# Patient Record
Sex: FEMALE | Race: WHITE | Employment: UNEMPLOYED | URBAN - METROPOLITAN AREA
[De-identification: names, ages, dates, MRNs, and addresses within clinical notes are randomized per-mention and may not be internally consistent; named-entity substitution may affect disease eponyms.]

---

## 2017-07-26 ENCOUNTER — OFFICE VISIT (OUTPATIENT)
Dept: FAMILY MEDICINE CLINIC | Age: 57
End: 2017-07-26

## 2017-07-26 VITALS
DIASTOLIC BLOOD PRESSURE: 88 MMHG | OXYGEN SATURATION: 100 % | HEIGHT: 67 IN | WEIGHT: 180 LBS | HEART RATE: 64 BPM | BODY MASS INDEX: 28.25 KG/M2 | TEMPERATURE: 95.8 F | SYSTOLIC BLOOD PRESSURE: 146 MMHG

## 2017-07-26 DIAGNOSIS — Z00.00 ROUTINE GENERAL MEDICAL EXAMINATION AT A HEALTH CARE FACILITY: Primary | ICD-10-CM

## 2017-07-26 DIAGNOSIS — Z00.00 ROUTINE GENERAL MEDICAL EXAMINATION AT A HEALTH CARE FACILITY: ICD-10-CM

## 2017-07-26 DIAGNOSIS — E03.9 ACQUIRED HYPOTHYROIDISM: ICD-10-CM

## 2017-07-26 LAB
A/G RATIO: 1.8 (ref 1.1–2.2)
ALBUMIN SERPL-MCNC: 4.9 G/DL (ref 3.4–5)
ALP BLD-CCNC: 62 U/L (ref 40–129)
ALT SERPL-CCNC: 27 U/L (ref 10–40)
ANION GAP SERPL CALCULATED.3IONS-SCNC: 14 MMOL/L (ref 3–16)
AST SERPL-CCNC: 23 U/L (ref 15–37)
BILIRUB SERPL-MCNC: 0.8 MG/DL (ref 0–1)
BUN BLDV-MCNC: 15 MG/DL (ref 7–20)
CALCIUM SERPL-MCNC: 9.8 MG/DL (ref 8.3–10.6)
CHLORIDE BLD-SCNC: 100 MMOL/L (ref 99–110)
CHOLESTEROL, TOTAL: 208 MG/DL (ref 0–199)
CO2: 27 MMOL/L (ref 21–32)
CREAT SERPL-MCNC: 0.7 MG/DL (ref 0.6–1.1)
GFR AFRICAN AMERICAN: >60
GFR NON-AFRICAN AMERICAN: >60
GLOBULIN: 2.7 G/DL
GLUCOSE BLD-MCNC: 90 MG/DL (ref 70–99)
HDLC SERPL-MCNC: 84 MG/DL (ref 40–60)
LDL CHOLESTEROL CALCULATED: 111 MG/DL
POTASSIUM SERPL-SCNC: 4.2 MMOL/L (ref 3.5–5.1)
SODIUM BLD-SCNC: 141 MMOL/L (ref 136–145)
TOTAL PROTEIN: 7.6 G/DL (ref 6.4–8.2)
TRIGL SERPL-MCNC: 66 MG/DL (ref 0–150)
TSH REFLEX: 2.76 UIU/ML (ref 0.27–4.2)
VITAMIN D 25-HYDROXY: 33.1 NG/ML
VLDLC SERPL CALC-MCNC: 13 MG/DL

## 2017-07-26 PROCEDURE — 99396 PREV VISIT EST AGE 40-64: CPT | Performed by: FAMILY MEDICINE

## 2017-07-26 ASSESSMENT — ENCOUNTER SYMPTOMS
ANAL BLEEDING: 0
CONSTIPATION: 0
CHOKING: 0
VOICE CHANGE: 0
NAUSEA: 0
DIARRHEA: 0
TROUBLE SWALLOWING: 0
SORE THROAT: 0
CHEST TIGHTNESS: 0
SHORTNESS OF BREATH: 0
ABDOMINAL PAIN: 0
WHEEZING: 0
BLOOD IN STOOL: 0

## 2017-07-29 PROBLEM — R03.0 ELEVATED BLOOD PRESSURE READING: Status: ACTIVE | Noted: 2017-07-26

## 2017-09-14 ENCOUNTER — TELEPHONE (OUTPATIENT)
Dept: DERMATOLOGY | Age: 57
End: 2017-09-14

## 2017-09-18 ENCOUNTER — NURSE ONLY (OUTPATIENT)
Dept: FAMILY MEDICINE CLINIC | Age: 57
End: 2017-09-18

## 2017-09-18 DIAGNOSIS — Z23 NEED FOR INFLUENZA VACCINATION: Primary | ICD-10-CM

## 2017-09-18 PROCEDURE — 90471 IMMUNIZATION ADMIN: CPT | Performed by: FAMILY MEDICINE

## 2017-09-18 PROCEDURE — 90686 IIV4 VACC NO PRSV 0.5 ML IM: CPT | Performed by: FAMILY MEDICINE

## 2017-10-11 DIAGNOSIS — E03.9 ACQUIRED HYPOTHYROIDISM: ICD-10-CM

## 2017-10-11 RX ORDER — LEVOTHYROXINE SODIUM 50 MCG
TABLET ORAL
Qty: 90 TABLET | Refills: 2 | Status: SHIPPED | OUTPATIENT
Start: 2017-10-11 | End: 2018-03-20

## 2018-03-08 ENCOUNTER — TELEPHONE (OUTPATIENT)
Dept: FAMILY MEDICINE CLINIC | Age: 58
End: 2018-03-08

## 2018-03-20 ENCOUNTER — OFFICE VISIT (OUTPATIENT)
Dept: FAMILY MEDICINE CLINIC | Age: 58
End: 2018-03-20

## 2018-03-20 VITALS
BODY MASS INDEX: 28.07 KG/M2 | WEIGHT: 184.6 LBS | OXYGEN SATURATION: 100 % | HEART RATE: 65 BPM | SYSTOLIC BLOOD PRESSURE: 132 MMHG | TEMPERATURE: 96 F | DIASTOLIC BLOOD PRESSURE: 84 MMHG | RESPIRATION RATE: 16 BRPM

## 2018-03-20 DIAGNOSIS — I10 ESSENTIAL HYPERTENSION, BENIGN: Primary | ICD-10-CM

## 2018-03-20 DIAGNOSIS — Z00.00 WELL ADULT EXAM: ICD-10-CM

## 2018-03-20 DIAGNOSIS — E03.9 ACQUIRED HYPOTHYROIDISM: ICD-10-CM

## 2018-03-20 DIAGNOSIS — R73.9 HYPERGLYCEMIA: ICD-10-CM

## 2018-03-20 PROCEDURE — 1111F DSCHRG MED/CURRENT MED MERGE: CPT | Performed by: FAMILY MEDICINE

## 2018-03-20 PROCEDURE — 99214 OFFICE O/P EST MOD 30 MIN: CPT | Performed by: FAMILY MEDICINE

## 2018-03-20 RX ORDER — LEVOTHYROXINE SODIUM 0.07 MG/1
75 TABLET ORAL DAILY
Qty: 90 TABLET | Refills: 1 | Status: SHIPPED | OUTPATIENT
Start: 2018-03-20 | End: 2018-09-05 | Stop reason: SDUPTHER

## 2018-03-20 RX ORDER — LOSARTAN POTASSIUM 50 MG/1
50 TABLET ORAL DAILY
Qty: 30 TABLET | Refills: 1 | Status: SHIPPED | OUTPATIENT
Start: 2018-03-20 | End: 2018-09-05 | Stop reason: SDUPTHER

## 2018-03-20 RX ORDER — LOSARTAN POTASSIUM 50 MG/1
50 TABLET ORAL DAILY
Qty: 90 TABLET | Refills: 1 | Status: SHIPPED | OUTPATIENT
Start: 2018-03-20 | End: 2018-03-20 | Stop reason: SDUPTHER

## 2018-03-20 NOTE — PROGRESS NOTES
Post-Discharge Transitional Care Management Services      Monica Bourne   YOB: 1960    Date of Office Visit:  3/20/2018  Date of Hospital Admission: 3/8/18  Date of Hospital Discharge: 3/8/18  [facility] Risk Score [risk of hospital readmission >=10  medium risk (chance of readmission ~ 12%) >14  high risk (chance of readmission ~18%)]:Risk Score: 0    Care management risk score Rising risk (score 2-5) and Complex Care (Scores >=6): 0       Patient Active Problem List   Diagnosis    Hypothyroidism    Rosacea    Routine general medical examination at a health care facility    Elevated blood pressure reading       Allergies   Allergen Reactions    Adhesive Tape      Pt had a burn around edges of bandage used with surgery in sept 2011 (compression type bandage    Sulfa Antibiotics Hives    Bactrim [Sulfamethoxazole-Trimethoprim] Rash    Lisinopril      cough       Medications listed as ordered at the time of discharge from hospital   Alvarado Lyons E   Home Medication Instructions JOHN:    Printed on:03/20/18 3774   Medication Information                      losartan (COZAAR) 25 MG tablet  Take 2 tablets by mouth daily             Omega 3-6-9 Fatty Acids (OMEGA 3-6-9 COMPLEX PO)  Take  by mouth 2 times daily. SYNTHROID 50 MCG tablet  TAKE 1 TABLET BY MOUTH DAILY             therapeutic multivitamin-minerals (THERAGRAN-M) tablet  Take 1 tablet by mouth daily. Medications marked \"taking\" at this time  Outpatient Prescriptions Marked as Taking for the 3/20/18 encounter (Office Visit) with Callum Malhotra MD   Medication Sig Dispense Refill    losartan (COZAAR) 25 MG tablet Take 2 tablets by mouth daily 30 tablet 0    SYNTHROID 50 MCG tablet TAKE 1 TABLET BY MOUTH DAILY 90 tablet 2    Omega 3-6-9 Fatty Acids (OMEGA 3-6-9 COMPLEX PO) Take  by mouth 2 times daily.  therapeutic multivitamin-minerals (THERAGRAN-M) tablet Take 1 tablet by mouth daily. Medications patient taking as of now reconciled against medications ordered at time of hospital discharge: Yes    Vitals:    03/20/18 1516 03/20/18 1539   BP: (!) 170/99 (!) 130/90   Pulse: 65    Resp: 16    Temp: 96 °F (35.6 °C)    SpO2: 100%    Weight: 184 lb 9.6 oz (83.7 kg)      Body mass index is 28.07 kg/m². Wt Readings from Last 3 Encounters:   03/20/18 184 lb 9.6 oz (83.7 kg)   03/08/18 180 lb (81.6 kg)   07/26/17 180 lb (81.6 kg)     BP Readings from Last 3 Encounters:   03/20/18 (!) 130/90   03/08/18 (!) 185/106   07/26/17 (!) 146/88        Inpatient course: Discharge summary reviewed- see chart. Chief Complaint   Patient presents with    Follow-Up from Hospital     seen at [facility] 3/8/18 for high BP, 136/84 this am     History of Present illness - Follow up of Hospital diagnosis(es): hypertension      Non face to face  following discharge, date last encounter closed (first attempt may have been earlier): *No documented post hospital discharge outreach found in the last 14 days    Call initiated 2 business days of discharge: *No response recorded in the last 14 days     Interval history/Current status: BP max was 200/110 at home. Checked BP because her  had a HA and was checking his. Went to ER. Was not taking NSAIDs or decongestants. Was asymptomatic. Work up in the ER was negative and was started on Cozaar. Home BP since than 117/67 to 151/92 the past 5 days. Higher if has been active. If she sits for 5 to 10 minutes it goes back down. She eats well, HAI  Exercises regularly. TSH in the ER was increased slightly. Physical Exam:  NAD    Skin is warm and dry. No rash. Well hydrated  Alert and oriented x 3. Mood and affect are normal.  The neck is supple and free of adenopathy or masses, the thyroid is normal without enlargement or nodules. Chest: clear with no wheezes or rales. No retractions, or use of accessory muscles noted.     Cardiovascular: PMI is not

## 2018-04-17 ENCOUNTER — OFFICE VISIT (OUTPATIENT)
Dept: DERMATOLOGY | Age: 58
End: 2018-04-17

## 2018-04-17 DIAGNOSIS — L21.9 SEBORRHEIC DERMATITIS: ICD-10-CM

## 2018-04-17 DIAGNOSIS — L70.0 ACNE VULGARIS: ICD-10-CM

## 2018-04-17 DIAGNOSIS — D22.9 MULTIPLE NEVI: Primary | ICD-10-CM

## 2018-04-17 DIAGNOSIS — Z80.8 FAMILY HISTORY OF MALIGNANT MELANOMA: ICD-10-CM

## 2018-04-17 PROCEDURE — 99213 OFFICE O/P EST LOW 20 MIN: CPT | Performed by: DERMATOLOGY

## 2018-06-08 DIAGNOSIS — Z00.00 WELL ADULT EXAM: ICD-10-CM

## 2018-06-08 DIAGNOSIS — I10 ESSENTIAL HYPERTENSION, BENIGN: ICD-10-CM

## 2018-06-08 DIAGNOSIS — R73.9 HYPERGLYCEMIA: ICD-10-CM

## 2018-06-08 DIAGNOSIS — E03.9 ACQUIRED HYPOTHYROIDISM: ICD-10-CM

## 2018-06-08 LAB
ALBUMIN SERPL-MCNC: 4.8 G/DL (ref 3.4–5)
ALP BLD-CCNC: 54 U/L (ref 40–129)
ALT SERPL-CCNC: 40 U/L (ref 10–40)
ANION GAP SERPL CALCULATED.3IONS-SCNC: 16 MMOL/L (ref 3–16)
AST SERPL-CCNC: 31 U/L (ref 15–37)
BILIRUB SERPL-MCNC: 0.8 MG/DL (ref 0–1)
BILIRUBIN DIRECT: <0.2 MG/DL (ref 0–0.3)
BILIRUBIN, INDIRECT: NORMAL MG/DL (ref 0–1)
BUN BLDV-MCNC: 15 MG/DL (ref 7–20)
CALCIUM SERPL-MCNC: 9.7 MG/DL (ref 8.3–10.6)
CHLORIDE BLD-SCNC: 99 MMOL/L (ref 99–110)
CHOLESTEROL, TOTAL: 207 MG/DL (ref 0–199)
CO2: 26 MMOL/L (ref 21–32)
CREAT SERPL-MCNC: 0.6 MG/DL (ref 0.6–1.1)
GFR AFRICAN AMERICAN: >60
GFR NON-AFRICAN AMERICAN: >60
GLUCOSE BLD-MCNC: 97 MG/DL (ref 70–99)
HDLC SERPL-MCNC: 86 MG/DL (ref 40–60)
LDL CHOLESTEROL CALCULATED: 104 MG/DL
POTASSIUM SERPL-SCNC: 3.9 MMOL/L (ref 3.5–5.1)
SODIUM BLD-SCNC: 141 MMOL/L (ref 136–145)
TOTAL PROTEIN: 7.5 G/DL (ref 6.4–8.2)
TRIGL SERPL-MCNC: 84 MG/DL (ref 0–150)
TSH REFLEX: 3.55 UIU/ML (ref 0.27–4.2)
VLDLC SERPL CALC-MCNC: 17 MG/DL

## 2018-06-09 LAB
ESTIMATED AVERAGE GLUCOSE: 102.5 MG/DL
HBA1C MFR BLD: 5.2 %

## 2018-09-05 DIAGNOSIS — E03.9 ACQUIRED HYPOTHYROIDISM: ICD-10-CM

## 2018-09-05 DIAGNOSIS — I10 ESSENTIAL HYPERTENSION, BENIGN: ICD-10-CM

## 2018-09-05 RX ORDER — LEVOTHYROXINE SODIUM 0.07 MG/1
75 TABLET ORAL DAILY
Qty: 90 TABLET | Refills: 2 | Status: SHIPPED | OUTPATIENT
Start: 2018-09-05 | End: 2019-06-12 | Stop reason: SDUPTHER

## 2018-09-05 RX ORDER — LOSARTAN POTASSIUM 50 MG/1
50 TABLET ORAL DAILY
Qty: 90 TABLET | Refills: 2 | Status: SHIPPED | OUTPATIENT
Start: 2018-09-05 | End: 2018-10-10 | Stop reason: SDUPTHER

## 2018-10-10 ENCOUNTER — OFFICE VISIT (OUTPATIENT)
Dept: FAMILY MEDICINE CLINIC | Age: 58
End: 2018-10-10
Payer: COMMERCIAL

## 2018-10-10 VITALS
RESPIRATION RATE: 10 BRPM | HEART RATE: 69 BPM | SYSTOLIC BLOOD PRESSURE: 130 MMHG | DIASTOLIC BLOOD PRESSURE: 82 MMHG | WEIGHT: 186.4 LBS | TEMPERATURE: 97.3 F | BODY MASS INDEX: 28.34 KG/M2 | OXYGEN SATURATION: 99 %

## 2018-10-10 DIAGNOSIS — I10 ESSENTIAL HYPERTENSION, BENIGN: ICD-10-CM

## 2018-10-10 DIAGNOSIS — R10.13 DYSPEPSIA: Primary | ICD-10-CM

## 2018-10-10 PROCEDURE — 99214 OFFICE O/P EST MOD 30 MIN: CPT | Performed by: FAMILY MEDICINE

## 2018-10-10 RX ORDER — LOSARTAN POTASSIUM 100 MG/1
100 TABLET ORAL DAILY
Qty: 90 TABLET | Refills: 1 | Status: SHIPPED | OUTPATIENT
Start: 2018-10-10 | End: 2018-12-10 | Stop reason: SDUPTHER

## 2018-10-10 RX ORDER — OMEPRAZOLE 20 MG/1
20 CAPSULE, DELAYED RELEASE ORAL DAILY
Qty: 30 CAPSULE | Refills: 1 | Status: SHIPPED | OUTPATIENT
Start: 2018-10-10 | End: 2018-12-10 | Stop reason: ALTCHOICE

## 2018-10-10 ASSESSMENT — PATIENT HEALTH QUESTIONNAIRE - PHQ9
2. FEELING DOWN, DEPRESSED OR HOPELESS: 0
SUM OF ALL RESPONSES TO PHQ9 QUESTIONS 1 & 2: 0
1. LITTLE INTEREST OR PLEASURE IN DOING THINGS: 0
SUM OF ALL RESPONSES TO PHQ QUESTIONS 1-9: 0
SUM OF ALL RESPONSES TO PHQ QUESTIONS 1-9: 0

## 2018-10-10 NOTE — PROGRESS NOTES
Subjective:      Patient ID: Ayaz Richard 66 y.o. female is here for evaluation for chest pain and BP check. HPI    Priscila Campoverde had an episode of upper back pain about 5 weeks ago. Was associated with pain in the lower anterior chest/epigastrium . No associated bowel changes, nausea vomiting. I talked to her on call; was advised to try Tums and watch the symptoms and follow up if persisted. Tums did help. Symptoms lasted 2 days and resolved. She has a reoccurrence of the same lower chest/upper abdomen pain x one week. No back pain this matthew. Waxes and wanes. Sometimes has burning in the mid chest.  Mild feeling of needing to burp. No associated with eating and no associated nausea or vomiting. No jono or hematochezia. Denies weight loss. Does not take NSAIDS>    The pain is not worse with exercise and has no shortness of breath. Rx: Tums helps slightly. Hypertension: Patient here for follow-up of elevated blood pressure. She is exercising and is adherent to low salt diet. Blood pressure is not well controlled at home. 140/80s  Cardiac symptoms none. Patient denies chest pain, dyspnea, irregular heart beat, lower extremity edema and palpitations. Cardiovascular risk factors: hypertension. Use of agents associated with hypertension: none. History of target organ damage: none. Outpatient Prescriptions Marked as Taking for the 10/10/18 encounter (Office Visit) with Flo Cornelius MD   Medication Sig Dispense Refill    levothyroxine (SYNTHROID) 75 MCG tablet Take 1 tablet by mouth Daily 90 tablet 2    losartan (COZAAR) 50 MG tablet Take 1 tablet by mouth daily 90 tablet 2    Omega 3-6-9 Fatty Acids (OMEGA 3-6-9 COMPLEX PO) Take  by mouth 2 times daily.  therapeutic multivitamin-minerals (THERAGRAN-M) tablet Take 1 tablet by mouth daily.           Allergies   Allergen Reactions    Adhesive Tape      Pt had a burn around edges of bandage used with surgery in sept 2011

## 2018-11-08 ENCOUNTER — OFFICE VISIT (OUTPATIENT)
Dept: FAMILY MEDICINE CLINIC | Age: 58
End: 2018-11-08
Payer: COMMERCIAL

## 2018-11-08 VITALS
DIASTOLIC BLOOD PRESSURE: 88 MMHG | BODY MASS INDEX: 28.71 KG/M2 | WEIGHT: 188.8 LBS | TEMPERATURE: 96.9 F | HEART RATE: 75 BPM | OXYGEN SATURATION: 100 % | SYSTOLIC BLOOD PRESSURE: 155 MMHG

## 2018-11-08 DIAGNOSIS — L03.116 CELLULITIS OF LEFT LEG: Primary | ICD-10-CM
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
## 2018-11-08 DIAGNOSIS — I10 ESSENTIAL HYPERTENSION: ICD-10-CM

## 2018-11-08 PROCEDURE — 99214 OFFICE O/P EST MOD 30 MIN: CPT | Performed by: FAMILY MEDICINE

## 2018-11-08 RX ORDER — DOXYCYCLINE HYCLATE 100 MG
100 TABLET ORAL 2 TIMES DAILY
Qty: 20 TABLET | Refills: 0 | Status: SHIPPED | OUTPATIENT
Start: 2018-11-08 | End: 2018-11-18

## 2018-11-08 RX ORDER — AMLODIPINE BESYLATE 5 MG/1
5 TABLET ORAL DAILY
Qty: 30 TABLET | Refills: 1 | Status: SHIPPED | OUTPATIENT
Start: 2018-11-08 | End: 2018-12-10 | Stop reason: SDUPTHER

## 2018-11-08 NOTE — PROGRESS NOTES
Patient is here for rash/ welts to left thigh. No new soaps, lotions or detergents or clothes. She had been doing some yard work, but wearing long pants. Itching . 4 spots to left inner thigh and 1 spot to right lower leg medial.   Itching. No fever. No caffeine this am.   Her blood pressure at home has been 140s/ 80s. Review of Systems    ROS: All other systems were reviewed and are negative . Patient's allergies and medications were reviewed. Patient's past medical, surgical, social , and family history were reviewed. BP Readings from Last 3 Encounters:   11/08/18 (!) 157/91   10/10/18 130/82   03/20/18 132/84     Wt Readings from Last 3 Encounters:   11/08/18 188 lb 12.8 oz (85.6 kg)   10/10/18 186 lb 6.4 oz (84.6 kg)   03/20/18 184 lb 9.6 oz (83.7 kg)     OBJECTIVE:  BP (!) 155/88   Pulse 75   Temp 96.9 °F (36.1 °C) (Oral)   Wt 188 lb 12.8 oz (85.6 kg)   SpO2 100%   Breastfeeding? No   BMI 28.71 kg/m²     Physical Exam    General: NAD, cooperative, alert and oriented X 3. Mood / affect is good. good insight. well hydrated. Neck : no lymphadenopathy, supple, FROM  CV: Regular rate and rhythm , no murmurs/ rub/ gallop. No edema. Lungs : CTA bilaterally, breathing comfortably  Abdomen: positive bowel sounds, soft , non tender, non distended. No hepatosplenomegaly. No CVA tenderness. Skin: LLE with erythematous circular rash X 4 areas with streakiness to medial, posterior thigh. mild tenderness to area. Center slightly raised of each area. No drainage. RLE:  1 patch of erythema, circular to posterior, medial calf- 5 cm. ASSESSMENT/  PLAN:  1. Cellulitis of left leg  - moist heat. - doxycycline hyclate (VIBRA-TABS) 100 MG tablet; Take 1 tablet by mouth 2 times daily for 10 days  Dispense: 20 tablet; Refill: 0    2. Bug bite, initial encounter  - moist heat. Benadryl prn itching or Zyrtec qd prn.     3. Essential hypertension  - elevated. Add Norvasc 5 mg qd.  Medication

## 2018-12-10 ENCOUNTER — OFFICE VISIT (OUTPATIENT)
Dept: FAMILY MEDICINE CLINIC | Age: 58
End: 2018-12-10
Payer: COMMERCIAL

## 2018-12-10 VITALS
BODY MASS INDEX: 28.81 KG/M2 | HEART RATE: 76 BPM | DIASTOLIC BLOOD PRESSURE: 80 MMHG | SYSTOLIC BLOOD PRESSURE: 138 MMHG | TEMPERATURE: 97.6 F | OXYGEN SATURATION: 100 % | WEIGHT: 189.5 LBS | RESPIRATION RATE: 12 BRPM

## 2018-12-10 DIAGNOSIS — I10 ESSENTIAL HYPERTENSION: Primary | ICD-10-CM

## 2018-12-10 PROBLEM — R03.0 ELEVATED BLOOD PRESSURE READING: Status: RESOLVED | Noted: 2017-07-26 | Resolved: 2018-12-10

## 2018-12-10 PROCEDURE — 99213 OFFICE O/P EST LOW 20 MIN: CPT | Performed by: FAMILY MEDICINE

## 2018-12-10 RX ORDER — AMLODIPINE BESYLATE 5 MG/1
5 TABLET ORAL DAILY
Qty: 90 TABLET | Refills: 1 | Status: SHIPPED | OUTPATIENT
Start: 2018-12-10 | End: 2019-05-28 | Stop reason: SDUPTHER

## 2018-12-10 RX ORDER — LOSARTAN POTASSIUM 100 MG/1
100 TABLET ORAL DAILY
Qty: 90 TABLET | Refills: 1 | Status: SHIPPED | OUTPATIENT
Start: 2018-12-10 | End: 2019-02-15 | Stop reason: SDUPTHER

## 2018-12-10 NOTE — PROGRESS NOTES
Subjective:      Patient ID: Waleska Arredondo 66 y.o. female. The encounter diagnosis was Essential hypertension. HPI    Hypertension: Patient here for follow-up of elevated blood pressure. She is exercising and is adherent to low salt diet. Blood pressure is well controlled at home. Cardiac symptoms none. Patient denies chest pain, chest pressure/discomfort, irregular heart beat, lower extremity edema and palpitations. Cardiovascular risk factors: hypertension and sedentary lifestyle. Use of agents associated with hypertension: none. History of target organ damage: angina/ prior myocardial infarction. Outpatient Prescriptions Marked as Taking for the 12/10/18 encounter (Office Visit) with Michelle Barrientos MD   Medication Sig Dispense Refill    amLODIPine (NORVASC) 5 MG tablet Take 1 tablet by mouth daily 30 tablet 1    losartan (COZAAR) 100 MG tablet Take 1 tablet by mouth daily 90 tablet 1    levothyroxine (SYNTHROID) 75 MCG tablet Take 1 tablet by mouth Daily 90 tablet 2    Omega 3-6-9 Fatty Acids (OMEGA 3-6-9 COMPLEX PO) Take  by mouth 2 times daily.  therapeutic multivitamin-minerals (THERAGRAN-M) tablet Take 1 tablet by mouth daily.           Allergies   Allergen Reactions    Adhesive Tape      Pt had a burn around edges of bandage used with surgery in sept 2011 (compression type bandage    Sulfa Antibiotics Hives    Bactrim [Sulfamethoxazole-Trimethoprim] Rash    Lisinopril      cough       Patient Active Problem List   Diagnosis    Hypothyroidism    Rosacea    Hypertension       Past Medical History:   Diagnosis Date    Hypertension     Hypothyroidism        Past Surgical History:   Procedure Laterality Date    APPENDECTOMY  2010    YARIEL AND BSO  2011    fibroid    URETER REVISION  9/2011    Dr. Delano Barrera        Family History   Problem Relation Age of Onset   Judyth Yamini Hypertension Mother     Thyroid Cancer Mother 76        anaplastic    Heart Disease Father         valve

## 2019-01-08 ENCOUNTER — TELEPHONE (OUTPATIENT)
Dept: FAMILY MEDICINE CLINIC | Age: 59
End: 2019-01-08

## 2019-02-15 DIAGNOSIS — I10 ESSENTIAL HYPERTENSION: ICD-10-CM

## 2019-02-15 RX ORDER — LOSARTAN POTASSIUM 100 MG/1
100 TABLET ORAL DAILY
Qty: 90 TABLET | Refills: 1 | Status: SHIPPED | OUTPATIENT
Start: 2019-02-15 | End: 2019-06-12 | Stop reason: SDUPTHER

## 2019-02-15 RX ORDER — LOSARTAN POTASSIUM 100 MG/1
100 TABLET ORAL DAILY
Qty: 90 TABLET | Refills: 1 | Status: SHIPPED | OUTPATIENT
Start: 2019-02-15 | End: 2019-02-15 | Stop reason: SDUPTHER

## 2019-05-28 DIAGNOSIS — I10 ESSENTIAL HYPERTENSION: ICD-10-CM

## 2019-05-29 RX ORDER — AMLODIPINE BESYLATE 5 MG/1
5 TABLET ORAL DAILY
Qty: 90 TABLET | Refills: 1 | Status: SHIPPED | OUTPATIENT
Start: 2019-05-29 | End: 2019-07-06

## 2019-05-30 ENCOUNTER — OFFICE VISIT (OUTPATIENT)
Dept: DERMATOLOGY | Age: 59
End: 2019-05-30
Payer: COMMERCIAL

## 2019-05-30 DIAGNOSIS — D22.9 MULTIPLE NEVI: Primary | ICD-10-CM

## 2019-05-30 DIAGNOSIS — Z80.8 FAMILY HISTORY OF MALIGNANT MELANOMA: ICD-10-CM

## 2019-05-30 DIAGNOSIS — L70.0 ACNE VULGARIS: ICD-10-CM

## 2019-05-30 DIAGNOSIS — D23.9 DERMATOFIBROMA: ICD-10-CM

## 2019-05-30 DIAGNOSIS — L57.0 AK (ACTINIC KERATOSIS): ICD-10-CM

## 2019-05-30 PROCEDURE — 17000 DESTRUCT PREMALG LESION: CPT | Performed by: DERMATOLOGY

## 2019-05-30 PROCEDURE — 99214 OFFICE O/P EST MOD 30 MIN: CPT | Performed by: DERMATOLOGY

## 2019-05-30 NOTE — PROGRESS NOTES
[facility]  Chilango Combs MD  [address]  1960    66 y.o. female     Date of Visit: 5/30/2019    Chief Complaint: moles/lesions  Chief Complaint   Patient presents with    Skin Exam     Last seen: 4-2018    History of Present Illness:  1. Here for full skin check; has scattered asx moles on the trunk and extremities, many present since child/young adult; no change in s/s/c of any lesions; no bleeding lesions. No personal hx of skin cancer. No family hx of skin cancer except for daughter with hx of atypical pigmented lesion as noted below. Wears sunscreen regularly. 2. F/u for several year hx of erythema and scaling involving the brows. It occurs intermittently and is worse in the winter. She again notes that she clears completely intermittently. She has tried HC 2.5 oint and elidel in the past w/o improvement. Previously the central part of her brows were flared. She picks at the area. Denies rough spots/scaling elsewhere on the face or body. 3. F/u mild comedonal acne and milia. She had used atralin for awhile after a previous visit but has not been using anything recently. Has been getting dermaplaning and feels that this helps. 4. She has a rough spot on the R cheek. Asx. Her daughter Lizet Hart had a pigmented lesion on the upper back treated as MM in situ (2014). Review of Systems:  Gen: Feels well, good sense of health. Skin: No changing moles or lesions. Past Medical History, Family History, Surgical History, Medications and Allergies reviewed.     Past Medical History:   Diagnosis Date    Hypertension     Hypothyroidism        Past Surgical History:   Procedure Laterality Date    APPENDECTOMY  2010    YARIEL AND BSO  2011    fibroid    URETER REVISION  9/2011    Dr. Elyssa Zhang       Outpatient Medications Marked as Taking for the 5/30/19 encounter (Office Visit) with Susan Pitts MD   Medication Sig Dispense Refill qhs; ed irritaitonm photosens    4. AK - R cheek  - lesion(s) treated with liquid nitrogen x 2 cycles. Patient educated on risk of blister, hypopigmentation/scar and wound care.

## 2019-06-11 NOTE — PROGRESS NOTES
Subjective:      Patient ID: Ramon Woodruff is a 66 y.o. female is here for her annual wellness exam.     HPI    Diet;  Eats pretty well  Eats at home most of the time. Low sodium  Sleeps well  Exercise walks, occ weights. S/P hysterectomy - fibroid. Is not checking BP at home. Health Maintenance   Topic Date Due    Shingles Vaccine (2 of 3) 03/17/2015    TSH testing  06/08/2019    Potassium monitoring  10/10/2019    Creatinine monitoring  10/10/2019    Breast cancer screen  05/15/2020    DTaP/Tdap/Td vaccine (2 - Td) 10/04/2021    Colon cancer screen colonoscopy  10/29/2022    Lipid screen  06/08/2023    Flu vaccine  Completed    Hepatitis C screen  Completed    HIV screen  Completed    Pneumococcal 0-64 years Vaccine  Aged Out           Outpatient Medications Marked as Taking for the 6/12/19 encounter (Office Visit) with Maria Del Carmen Iglesias MD   Medication Sig Dispense Refill    losartan (COZAAR) 100 MG tablet Take 1 tablet by mouth daily 90 tablet 3    SYNTHROID 75 MCG tablet Take 1 tablet by mouth Daily 90 tablet 3    tretinoin (RETIN-A) 0.025 % cream Apply a pea sized amount to the face QHS 20 g 4    amLODIPine (NORVASC) 5 MG tablet Take 1 tablet by mouth daily 90 tablet 1    Omega 3-6-9 Fatty Acids (OMEGA 3-6-9 COMPLEX PO) Take  by mouth 2 times daily.  therapeutic multivitamin-minerals (THERAGRAN-M) tablet Take 1 tablet by mouth daily.          Allergies   Allergen Reactions    Adhesive Tape      Pt had a burn around edges of bandage used with surgery in sept 2011 (compression type bandage    Sulfa Antibiotics Hives    Bactrim [Sulfamethoxazole-Trimethoprim] Rash    Lisinopril      cough       Patient Active Problem List   Diagnosis    Hypothyroidism    Rosacea    Hypertension        Past Medical History:   Diagnosis Date    Hypertension     Hypothyroidism        Past Surgical History:   Procedure Laterality Date    APPENDECTOMY  2010    YARIEL AND BSO  2011    fibroid    AST 23 10/10/2018    ALT 30 10/10/2018    LABGLOM >60 10/10/2018    GFRAA >60 10/10/2018    AGRATIO 1.7 10/10/2018    GLOB 2.7 10/10/2018       Lab Results   Component Value Date    CHOL 207 (H) 06/08/2018    CHOL 208 (H) 07/26/2017    CHOL 225 (H) 07/25/2016     Lab Results   Component Value Date    TRIG 84 06/08/2018    TRIG 66 07/26/2017    TRIG 71 07/25/2016     Lab Results   Component Value Date    HDL 86 (H) 06/08/2018    HDL 84 (H) 07/26/2017     (H) 07/25/2016     Lab Results   Component Value Date    LDLCALC 104 (H) 06/08/2018    LDLCALC 111 (H) 07/26/2017    LDLCALC 106 (H) 07/25/2016     Lab Results   Component Value Date    LABVLDL 17 06/08/2018    LABVLDL 13 07/26/2017    LABVLDL 14 07/25/2016     No results found for: CHOLHDLRATIO  TSH   Date Value Ref Range Status   06/08/2018 3.55 0.27 - 4.20 uIU/mL Final   03/08/2018 4.24 (H) 0.27 - 4.20 uIU/mL Final   07/26/2017 2.76 0.27 - 4.20 uIU/mL Final   05/16/2013 1.97 uIU/mL Final   06/19/2012 2.42 0.35 - 5.5 uIU/ml Final   10/04/2011 3.42 0.35 - 5.5 uIU/ml Final   12/16/2010 2.23 0.35 - 5.5 uIU/ml Final      Objective:   Physical Exam  .  Vitals:    06/12/19 0941 06/12/19 1039   BP: (!) 171/101 128/80   Pulse: 74    Resp: 20    Temp: 96.6 °F (35.9 °C)    TempSrc: Oral    SpO2: 99%    Weight: 189 lb 8 oz (86 kg)    Height: 5' 8\" (1.727 m)       Wt Readings from Last 3 Encounters:   06/12/19 189 lb 8 oz (86 kg)   12/10/18 189 lb 8 oz (86 kg)   11/08/18 188 lb 12.8 oz (85.6 kg)        Physical Exam   Constitutional: She appears well-developed and well-nourished. HENT:   Head: Normocephalic and atraumatic. Right Ear: Hearing, tympanic membrane, external ear and ear canal normal.   Left Ear: Hearing, tympanic membrane, external ear and ear canal normal.   Nose: Nose normal.   Mouth/Throat: Uvula is midline, oropharynx is clear and moist and mucous membranes are normal.   Eyes: Pupils are equal, round, and reactive to light.  Conjunctivae, EOM and and behavior is normal. Judgment and thought content normal. Cognition and memory are normal.         Assessment and Plan       Diagnosis Orders   1. Well adult exam  Comprehensive Metabolic Panel    TSH with Reflex    Lipid Panel  Discussed diet, exercise   Calcium and Vitamin D addressed  PAP not indicated  Annual to biannual mammogram  Annual influenza vaccine  Dt every 10 years  Colonoscopy every 10 years until age 78       2. Essential hypertension  Comprehensive Metabolic Panel    losartan (COZAAR) 100 MG tablet    At goal < 130/80   3. Acquired hypothyroidism  TSH with Reflex    SYNTHROID 75 MCG tablet              Side effects of current medications reviewed and questions answered. Follow up in 1 year or prn.

## 2019-06-12 ENCOUNTER — OFFICE VISIT (OUTPATIENT)
Dept: FAMILY MEDICINE CLINIC | Age: 59
End: 2019-06-12
Payer: COMMERCIAL

## 2019-06-12 VITALS
SYSTOLIC BLOOD PRESSURE: 128 MMHG | RESPIRATION RATE: 20 BRPM | HEART RATE: 74 BPM | OXYGEN SATURATION: 99 % | BODY MASS INDEX: 28.72 KG/M2 | HEIGHT: 68 IN | TEMPERATURE: 96.6 F | WEIGHT: 189.5 LBS | DIASTOLIC BLOOD PRESSURE: 80 MMHG

## 2019-06-12 DIAGNOSIS — I10 ESSENTIAL HYPERTENSION: ICD-10-CM

## 2019-06-12 DIAGNOSIS — E03.9 ACQUIRED HYPOTHYROIDISM: ICD-10-CM

## 2019-06-12 DIAGNOSIS — Z00.00 WELL ADULT EXAM: Primary | ICD-10-CM

## 2019-06-12 DIAGNOSIS — Z00.00 WELL ADULT EXAM: ICD-10-CM

## 2019-06-12 LAB
A/G RATIO: 1.5 (ref 1.1–2.2)
ALBUMIN SERPL-MCNC: 4.8 G/DL (ref 3.4–5)
ALP BLD-CCNC: 50 U/L (ref 40–129)
ALT SERPL-CCNC: 72 U/L (ref 10–40)
ANION GAP SERPL CALCULATED.3IONS-SCNC: 14 MMOL/L (ref 3–16)
AST SERPL-CCNC: 52 U/L (ref 15–37)
BILIRUB SERPL-MCNC: 0.7 MG/DL (ref 0–1)
BUN BLDV-MCNC: 17 MG/DL (ref 7–20)
CALCIUM SERPL-MCNC: 10 MG/DL (ref 8.3–10.6)
CHLORIDE BLD-SCNC: 103 MMOL/L (ref 99–110)
CHOLESTEROL, TOTAL: 202 MG/DL (ref 0–199)
CO2: 24 MMOL/L (ref 21–32)
CREAT SERPL-MCNC: 0.7 MG/DL (ref 0.6–1.1)
GFR AFRICAN AMERICAN: >60
GFR NON-AFRICAN AMERICAN: >60
GLOBULIN: 3.1 G/DL
GLUCOSE BLD-MCNC: 100 MG/DL (ref 70–99)
HDLC SERPL-MCNC: 83 MG/DL (ref 40–60)
LDL CHOLESTEROL CALCULATED: 102 MG/DL
POTASSIUM SERPL-SCNC: 4 MMOL/L (ref 3.5–5.1)
SODIUM BLD-SCNC: 141 MMOL/L (ref 136–145)
TOTAL PROTEIN: 7.9 G/DL (ref 6.4–8.2)
TRIGL SERPL-MCNC: 84 MG/DL (ref 0–150)
TSH REFLEX: 2.28 UIU/ML (ref 0.27–4.2)
VLDLC SERPL CALC-MCNC: 17 MG/DL

## 2019-06-12 PROCEDURE — 99396 PREV VISIT EST AGE 40-64: CPT | Performed by: FAMILY MEDICINE

## 2019-06-12 RX ORDER — LEVOTHYROXINE SODIUM 75 MCG
75 TABLET ORAL DAILY
Qty: 90 TABLET | Refills: 3 | Status: SHIPPED | OUTPATIENT
Start: 2019-06-12 | End: 2019-06-12 | Stop reason: SDUPTHER

## 2019-06-12 RX ORDER — LOSARTAN POTASSIUM 100 MG/1
100 TABLET ORAL DAILY
Qty: 90 TABLET | Refills: 3 | Status: SHIPPED | OUTPATIENT
Start: 2019-06-12 | End: 2019-08-14 | Stop reason: SDUPTHER

## 2019-06-12 RX ORDER — LEVOTHYROXINE SODIUM 75 MCG
75 TABLET ORAL DAILY
Qty: 90 TABLET | Refills: 3 | Status: SHIPPED | OUTPATIENT
Start: 2019-06-12 | End: 2020-04-14

## 2019-06-12 RX ORDER — LOSARTAN POTASSIUM 100 MG/1
100 TABLET ORAL DAILY
Qty: 90 TABLET | Refills: 3 | Status: SHIPPED | OUTPATIENT
Start: 2019-06-12 | End: 2019-06-12 | Stop reason: SDUPTHER

## 2019-06-12 ASSESSMENT — PATIENT HEALTH QUESTIONNAIRE - PHQ9
1. LITTLE INTEREST OR PLEASURE IN DOING THINGS: 0
2. FEELING DOWN, DEPRESSED OR HOPELESS: 0
SUM OF ALL RESPONSES TO PHQ QUESTIONS 1-9: 0
SUM OF ALL RESPONSES TO PHQ QUESTIONS 1-9: 0
SUM OF ALL RESPONSES TO PHQ9 QUESTIONS 1 & 2: 0

## 2019-06-12 ASSESSMENT — ENCOUNTER SYMPTOMS
CONSTIPATION: 0
VOICE CHANGE: 0
NAUSEA: 0
CHOKING: 0
SHORTNESS OF BREATH: 0
CHEST TIGHTNESS: 0
WHEEZING: 0
TROUBLE SWALLOWING: 0
SORE THROAT: 0
DIARRHEA: 0
ABDOMINAL PAIN: 0
BLOOD IN STOOL: 0
ANAL BLEEDING: 0

## 2019-06-13 DIAGNOSIS — R74.8 ELEVATED LIVER ENZYMES: Primary | ICD-10-CM

## 2020-04-14 RX ORDER — AMLODIPINE BESYLATE 5 MG/1
TABLET ORAL
Qty: 90 TABLET | Refills: 0 | Status: SHIPPED | OUTPATIENT
Start: 2020-04-14 | End: 2020-06-19 | Stop reason: SDUPTHER

## 2020-04-14 RX ORDER — LOSARTAN POTASSIUM 100 MG/1
TABLET ORAL
Qty: 90 TABLET | Refills: 0 | Status: SHIPPED | OUTPATIENT
Start: 2020-04-14 | End: 2020-06-19 | Stop reason: SDUPTHER

## 2020-04-14 RX ORDER — LEVOTHYROXINE SODIUM 75 MCG
TABLET ORAL
Qty: 90 TABLET | Refills: 0 | Status: SHIPPED | OUTPATIENT
Start: 2020-04-14 | End: 2020-06-19 | Stop reason: SDUPTHER

## 2020-05-06 ENCOUNTER — TELEPHONE (OUTPATIENT)
Dept: FAMILY MEDICINE CLINIC | Age: 60
End: 2020-05-06

## 2020-05-06 ENCOUNTER — E-VISIT (OUTPATIENT)
Dept: FAMILY MEDICINE CLINIC | Age: 60
End: 2020-05-06
Payer: COMMERCIAL

## 2020-05-06 DIAGNOSIS — R30.0 DYSURIA: ICD-10-CM

## 2020-05-06 LAB
BILIRUBIN URINE: NEGATIVE
BLOOD, URINE: ABNORMAL
CLARITY: ABNORMAL
COLOR: YELLOW
EPITHELIAL CELLS, UA: 1 /HPF (ref 0–5)
GLUCOSE URINE: NEGATIVE MG/DL
HYALINE CASTS: 1 /LPF (ref 0–8)
KETONES, URINE: NEGATIVE MG/DL
LEUKOCYTE ESTERASE, URINE: ABNORMAL
MICROSCOPIC EXAMINATION: YES
NITRITE, URINE: NEGATIVE
PH UA: 6 (ref 5–8)
PROTEIN UA: NEGATIVE MG/DL
RBC UA: 4 /HPF (ref 0–4)
SPECIFIC GRAVITY UA: 1.01 (ref 1–1.03)
URINE REFLEX TO CULTURE: YES
URINE TYPE: ABNORMAL
UROBILINOGEN, URINE: 0.2 E.U./DL
WBC UA: 42 /HPF (ref 0–5)

## 2020-05-06 PROCEDURE — 99421 OL DIG E/M SVC 5-10 MIN: CPT | Performed by: FAMILY MEDICINE

## 2020-05-06 NOTE — PROGRESS NOTES
Patient Active Problem List   Diagnosis    Hypothyroidism    Rosacea    Hypertension      Past Medical History:   Diagnosis Date    Hypertension     Hypothyroidism      Social History     Tobacco Use    Smoking status: Never Smoker    Smokeless tobacco: Never Used   Substance Use Topics    Alcohol use: Yes     Alcohol/week: 1.7 standard drinks     Types: 2 Standard drinks or equivalent per week    Drug use: No      Allergies   Allergen Reactions    Adhesive Tape      Pt had a burn around edges of bandage used with surgery in sept 2011 (compression type bandage    Sulfa Antibiotics Hives    Bactrim [Sulfamethoxazole-Trimethoprim] Rash    Lisinopril      cough       Diagnosis Orders   1. Dysuria  URINE RT REFLEX TO CULTURE      If cx neg will need in office visit. 5-10 minutes were spent on the digital evaluation and management of this patient.

## 2020-05-08 LAB
ORGANISM: ABNORMAL
URINE CULTURE, ROUTINE: ABNORMAL

## 2020-05-08 RX ORDER — NITROFURANTOIN 25; 75 MG/1; MG/1
100 CAPSULE ORAL 2 TIMES DAILY
Qty: 14 CAPSULE | Refills: 0 | Status: SHIPPED | OUTPATIENT
Start: 2020-05-08 | End: 2020-05-15

## 2020-05-08 RX ORDER — CEPHALEXIN 500 MG/1
500 CAPSULE ORAL 3 TIMES DAILY
Qty: 15 CAPSULE | Refills: 0 | Status: SHIPPED | OUTPATIENT
Start: 2020-05-08 | End: 2020-05-13

## 2020-06-08 ENCOUNTER — TELEPHONE (OUTPATIENT)
Dept: FAMILY MEDICINE CLINIC | Age: 60
End: 2020-06-08

## 2020-06-18 NOTE — PROGRESS NOTES
2020    TELEHEALTH EVALUATION -- Audio/Visual ([facility])    HPI:    Rosalinda Medina (:  1960) has requested an audio/video evaluation for the following concern(s):    The primary encounter diagnosis was Well adult exam. Diagnoses of Acquired hypothyroidism, Essential hypertension, and Need for shingles vaccine were also pertinent to this visit. [facility]!!!!!!!!!!!!!!!!!!!     Stress  having a stroke and PE 6 mos ago. Managing well   Diet: healthy   Exercise:  Sporadic. Is walking. Gym opened up and started back. Doing strength training  Sleeping well  PAP not indicated.   S/P hysterectomy        Lab Results   Component Value Date     2020    K 4.1 2020    CL 99 2020    CO2 27 2020    BUN 13 2020    CREATININE 0.7 2020    GLUCOSE 96 2020    CALCIUM 9.5 2020    PROT 7.1 2020    LABALBU 4.6 2020    BILITOT 0.8 2020    ALKPHOS 51 2020    AST 21 2020    ALT 26 2020    LABGLOM >60 2020    GFRAA >60 2020    AGRATIO 1.8 2020    GLOB 2.5 2020        Lab Results   Component Value Date    WBC 5.0 2020    HGB 15.1 2020    HCT 45.9 2020    MCV 93.4 2020     2020     Lab Results   Component Value Date    CHOL 184 2020    CHOL 202 (H) 2019    CHOL 207 (H) 2018     Lab Results   Component Value Date    TRIG 94 2020    TRIG 84 2019    TRIG 84 2018     Lab Results   Component Value Date    HDL 65 (H) 2020    HDL 83 (H) 2019    HDL 86 (H) 2018     Lab Results   Component Value Date    LDLCALC 100 (H) 2020    LDLCALC 102 (H) 2019    LDLCALC 104 (H) 2018     Lab Results   Component Value Date    LABVLDL 19 2020    LABVLDL 17 2019    LABVLDL 17 2018     No results found for: CHOLHDLRATIO  TSH   Date Value Ref Range Status   2020 3.15 0.27 - ,   Family History   Problem Relation Age of Onset    Hypertension Mother     Thyroid Cancer Mother 76        anaplastic    Rashes/Skin Problems Mother     Heart Disease Father         valve    Rashes/Skin Problems Other    ,   Immunization History   Administered Date(s) Administered    Hepatitis A 04/07/2014, 10/08/2014    Influenza Vaccine, unspecified formulation 10/26/2016    Influenza Virus Vaccine 10/04/2011, 09/24/2014, 09/21/2015, 09/17/2018    Influenza, Dunia Elder, IM, PF (6 mo and older Fluzone, Flulaval, Fluarix, and 3 yrs and older Afluria) 09/18/2017, 09/18/2019    Td, unspecified formulation 10/28/2002    Tdap (Boostrix, Adacel) 10/04/2011    Zoster Live (Zostavax) 01/20/2015   ,   Health Maintenance   Topic Date Due    Shingles Vaccine (2 of 3) 03/17/2015    TSH testing  06/11/2021    Potassium monitoring  06/11/2021    Creatinine monitoring  06/11/2021    DTaP/Tdap/Td vaccine (2 - Td) 10/04/2021    Breast cancer screen  05/08/2022    Colon cancer screen colonoscopy  10/29/2022    Lipid screen  06/11/2025    Flu vaccine  Completed    Hepatitis C screen  Completed    HIV screen  Completed    Hepatitis A vaccine  Aged Out    Hepatitis B vaccine  Aged Out    Hib vaccine  Aged Out    Meningococcal (ACWY) vaccine  Aged Out    Pneumococcal 0-64 years Vaccine  Aged Out       PHYSICAL EXAMINATION:  [ INSTRUCTIONS:  \"[x]\" Indicates a positive item  \"[]\" Indicates a negative item  -- DELETE ALL ITEMS NOT EXAMINED]  Vital Signs: (As obtained by patient/caregiver or practitioner observation)    Blood pressure- 131/76 Heart rate- 76   Respiratory rate-    Temperature-97.7  Pulse oximetry-   Weight 186 lbs  Wt Readings from Last 3 Encounters:   06/12/19 189 lb 8 oz (86 kg)   12/10/18 189 lb 8 oz (86 kg)   11/08/18 188 lb 12.8 oz (85.6 kg)     Temp Readings from Last 3 Encounters:   06/12/19 96.6 °F (35.9 °C) (Oral)   12/10/18 97.6 °F (36.4 °C)   11/08/18 96.9 °F (36.1 °C) (Oral)     BP

## 2020-06-19 ENCOUNTER — TELEPHONE (OUTPATIENT)
Dept: DERMATOLOGY | Age: 60
End: 2020-06-19

## 2020-06-19 ENCOUNTER — TELEMEDICINE (OUTPATIENT)
Dept: FAMILY MEDICINE CLINIC | Age: 60
End: 2020-06-19
Payer: COMMERCIAL

## 2020-06-19 PROCEDURE — 99396 PREV VISIT EST AGE 40-64: CPT | Performed by: FAMILY MEDICINE

## 2020-06-19 RX ORDER — LEVOTHYROXINE SODIUM 75 MCG
TABLET ORAL
Qty: 90 TABLET | Refills: 3 | Status: SHIPPED | OUTPATIENT
Start: 2020-06-19 | End: 2021-06-18 | Stop reason: SDUPTHER

## 2020-06-19 RX ORDER — LOSARTAN POTASSIUM 100 MG/1
TABLET ORAL
Qty: 90 TABLET | Refills: 3 | Status: SHIPPED | OUTPATIENT
Start: 2020-06-19 | End: 2021-06-18 | Stop reason: SDUPTHER

## 2020-06-19 RX ORDER — AMLODIPINE BESYLATE 5 MG/1
TABLET ORAL
Qty: 90 TABLET | Refills: 3 | Status: SHIPPED | OUTPATIENT
Start: 2020-06-19 | End: 2021-06-18 | Stop reason: SDUPTHER

## 2020-06-24 ENCOUNTER — NURSE TRIAGE (OUTPATIENT)
Dept: OTHER | Facility: CLINIC | Age: 60
End: 2020-06-24

## 2020-06-24 NOTE — TELEPHONE ENCOUNTER
Reason for Disposition   [1] Fever AND [2] no signs of serious infection or localizing symptoms (all other triage questions negative)    Answer Assessment - Initial Assessment Questions  1. TEMPERATURE: \"What is the most recent temperature? \"  \"How was it measured? \"       100.1 orally  2. ONSET: \"When did the fever start? \"       today  3. SYMPTOMS: \"Do you have any other symptoms besides the fever? \"  (e.g., colds, headache, sore throat, earache, cough, rash, diarrhea, vomiting, abdominal pain)      No  4. CAUSE: If there are no symptoms, ask: \"What do you think is causing the fever? \"       unknown  5. CONTACTS: \"Does anyone else in the family have an infection? \"      No  6. TREATMENT: \"What have you done so far to treat this fever? \" (e.g., medications)      Fluids  7. IMMUNOCOMPROMISE: \"Do you have of the following: diabetes, HIV positive, splenectomy, cancer chemotherapy, chronic steroid treatment, transplant patient, etc.\"      No  8. PREGNANCY: \"Is there any chance you are pregnant? \" \"When was your last menstrual period? \"      No  9. TRAVEL: \"Have you traveled out of the country in the last month? \" (e.g., travel history, exposures)      No    Protocols used: [facility]    Patient called [place] [facility]) to schedule appointment, with red flag complaint, transferred to RN access for triage. Pt calls to report symptoms of fever. Pt states fever started today. Most recent fever of 100.1 orally. Pt has not taken any OTC medications at this time. Denies breathing difficulty or stiff neck. Patient informed of disposition. Care advice as documented. Instructed patient to call back with worsening symptoms. Patient verbalized understanding and denies any further questions/concerns. Please do not respond to the triage nurse through this encounter. Any subsequent communication should be directly with the patient.

## 2020-06-25 ENCOUNTER — PATIENT MESSAGE (OUTPATIENT)
Dept: FAMILY MEDICINE CLINIC | Age: 60
End: 2020-06-25

## 2020-06-25 ENCOUNTER — TELEPHONE (OUTPATIENT)
Dept: DERMATOLOGY | Age: 60
End: 2020-06-25

## 2020-06-25 NOTE — TELEPHONE ENCOUNTER
Pt calling to r/s with  due to having a fever this morning pls return call to r/s appt back @ [phone] to discuss

## 2020-06-26 ENCOUNTER — OFFICE VISIT (OUTPATIENT)
Dept: PRIMARY CARE CLINIC | Age: 60
End: 2020-06-26
Payer: COMMERCIAL

## 2020-06-26 PROCEDURE — 99213 OFFICE O/P EST LOW 20 MIN: CPT | Performed by: NURSE PRACTITIONER

## 2020-06-26 NOTE — PROGRESS NOTES
organization: Not on file     Attends meetings of clubs or organizations: Not on file     Relationship status: Not on file    Intimate partner violence     Fear of current or ex partner: Not on file     Emotionally abused: Not on file     Physically abused: Not on file     Forced sexual activity: Not on file   Other Topics Concern    Not on file   Social History Narrative    Not on file     Family History   Problem Relation Age of Onset    Hypertension Mother     Thyroid Cancer Mother 76        anaplastic    Rashes/Skin Problems Mother     Heart Disease Father         valve    Rashes/Skin Problems Other        RISK FACTORS:  []Pregnancy   []Diabetes  []Heart disease  []Asthma  []COPD/Other chronic lung diseases  []Active Cancer/Chemotherapy   []Taking oral steroids  [x]Close contact with a lab confirmed COVID-19 patient within 14 days of symptom onset  []Health Care Worker Exposure no symptoms  []Health Care Worker Exposure symptomatic    Assessment  Physical Exam    Constitutional:       Appearance: Normal appearance. HENT:      Head: Normocephalic and atraumatic. Mouth/Throat:      Mouth: Mucous membranes are moist.   Neck:      Musculoskeletal: Normal range of motion. Cardiovascular:     Skin pink and warm     Pulmonary:      Effort: Pulmonary effort is normal.   Musculoskeletal: Normal range of motion. Skin:     General: Skin is warm and dry. Neurological:      General: No focal deficit present. Mental Status: Alert. Mental status is at baseline. Test ordered:    [x]COVID    _________  []Strep    ___________      Diagnosis and Plan:      Diagnosis Orders   1. Suspected COVID-19 virus infection  Covid-19 Ambulatory     COVID-19 swab complete at clinic and sent to lab for testing. Quarantine order in place, patient verbalized understanding.  Preventing the spread of Coronavirus, Possible COVID-19 exposure with self-quarantine, isolation instructions and management of symptoms, and to follow-up with primary care physician or emergency department if symptomatic complaints worsen.

## 2020-06-30 LAB
SARS-COV-2: NOT DETECTED
SOURCE: NORMAL

## 2020-07-21 ENCOUNTER — OFFICE VISIT (OUTPATIENT)
Dept: DERMATOLOGY | Age: 60
End: 2020-07-21
Payer: COMMERCIAL

## 2020-07-21 VITALS — TEMPERATURE: 97.9 F

## 2020-07-21 PROCEDURE — 99214 OFFICE O/P EST MOD 30 MIN: CPT | Performed by: DERMATOLOGY

## 2020-07-21 PROCEDURE — 17000 DESTRUCT PREMALG LESION: CPT | Performed by: DERMATOLOGY

## 2020-07-21 NOTE — PROGRESS NOTES
MD Sky   Medication Sig Dispense Refill    losartan (COZAAR) 100 MG tablet TAKE 1 TABLET DAILY 90 tablet 3    SYNTHROID 75 MCG tablet TAKE 1 TABLET DAILY 90 tablet 3    amLODIPine (NORVASC) 5 MG tablet TAKE 1 TABLET DAILY 90 tablet 3    tretinoin (RETIN-A) 0.025 % cream Apply a pea sized amount to the face QHS 20 g 4    Omega 3-6-9 Fatty Acids (OMEGA 3-6-9 COMPLEX PO) Take  by mouth 2 times daily.  therapeutic multivitamin-minerals (THERAGRAN-M) tablet Take 1 tablet by mouth daily. Allergies   Allergen Reactions    Adhesive Tape      Pt had a burn around edges of bandage used with surgery in sept 2011 (compression type bandage    Sulfa Antibiotics Hives    Bactrim [Sulfamethoxazole-Trimethoprim] Rash    Lisinopril      cough       Physical Examination     Gen, well-appearing  The following were examined and determined to be normal: Psych/Neuro, Scalp/hair, Conjunctivae/eyelids, Gums/teeth/lips, Neck, Breast/axilla/chest, Abdomen, Back, RUE, LUE, RLE, LLE, Nails/digits and buttocks. The following were examined and determined to be abnormal: Head/face. 1. trunk and extremities with scattered brown macules and papules   R medial shin with firm dermal pinkish-brown papule   2. Lateral brows with scaling and mild erythema and focal crust (variably has been both medial and lateral or central brows)  3. Central > lateral cheeks with few closed comedones and few milia  4. R brow (just above) with erythematous roughly scaled macule     Assessment and Plan     1a. Benign-appearing nevi and family hx of melanoma (daughter)\  1b. DF - R medial shin  - educ re ABCD's of MM   educ sun protection   encouraged skin check 1-2 years (sooner if indicated), self checks    2.  Rough and scaly skin in brows - ? seb derm (brows) with excoriations, mild today vs ulerythema ophryogenes  - discussed HC oint or elidel bid prn flares but she deferred trx  - ed re-eval if no improvement or worsening - consider bx but variable areas have been involved and limited trx options    3. Comedonal acne, mild - cheeks and few milia - stable  - tretinoin qhs; ed irritaitonm photosens    4. 1 AK on the R brow - lesion(s) treated with liquid nitrogen x 2 cycles. Patient educated on risk of blister, hypopigmentation/scar and wound care. Also briefly discussed - dry skin near eyes, prominence of eyes and is hypothyroid. She doesn't feel that eyes are any more prominent/bulging than they always have been. Is considering bleph - rec eval at [facility] and ? Possibility of Graves.

## 2020-07-21 NOTE — PATIENT INSTRUCTIONS
Cryosurgery (Freezing) Wound Care Instructions    AFTER THE PROCEDURE:    You will notice swelling and redness around the site. This is normal.    You may experience a sharp or sore feeling for the next several days. For this discomfort, you may take acetaminophen (Tylenol©).  A blister may develop at the treated area, sometimes as soon as by the end of the day. After several days, the blister will subside and a scab will form.  If the area is bumped or traumatized during the first few days following freezing, you may develop bleeding into the blister, forming a blood blister. This is nothing to be alarmed about.  If the blister is tense, uncomfortable, or much larger than the site that was frozen, you may pop the blister along its edge with a sterile needle (boiled, heated under a flame, or cleaned with alcohol) to allow the fluid to drain out. If the blister does not bother you, no treatment is needed.  Do NOT peel off the top of the blister roof. It will act as a dressing on top of your wound. WOUND CARE:    You may shower or bathe as usual, but avoid scrubbing the areas that have been frozen.  Cleanse the site twice a day with mild soapy water, and then apply a thin film of white petrolatum (Vaseline©).  You do not need to cover the area, but can if you prefer.  Do NOT allow the site to become dry or crusted, or attempt to dry it out with rubbing alcohol or hydrogen peroxide.  Continue this regimen until the area is pink and healed. Depending on the size and location of your cryosurgery site, healing may take 2 to 4 weeks.  The area may continue to be pink for several weeks, and over the next few months may become darker or lighter than the surrounding skin. This may be a permanent change.          [facility] [place] - [employer] or others on website ([phone])

## 2020-11-04 ENCOUNTER — PATIENT MESSAGE (OUTPATIENT)
Dept: DERMATOLOGY | Age: 60
End: 2020-11-04

## 2020-11-05 ENCOUNTER — TELEPHONE (OUTPATIENT)
Dept: DERMATOLOGY | Age: 60
End: 2020-11-05

## 2020-11-05 NOTE — TELEPHONE ENCOUNTER
Pt returning Elias Griggs call back regarding a appt spot on her face pls return call back @ [phone] to discuss

## 2020-11-05 NOTE — TELEPHONE ENCOUNTER
From: Smiley Powers  To: Catracho Sunshine MD  Sent: 11/4/2020 2:59 PM EST  Subject: Non-Urgent Medical Question    Hello,  I have a spot on my cheek that doesn't heal. It has been there for about six weeks. Sometimes it gets better, but it never seems to completely heal. I wondering if it may be infected?   Thank you,  Patricia Stout

## 2020-11-12 ENCOUNTER — OFFICE VISIT (OUTPATIENT)
Dept: DERMATOLOGY | Age: 60
End: 2020-11-12
Payer: COMMERCIAL

## 2020-11-12 VITALS — TEMPERATURE: 97.7 F

## 2020-11-12 PROCEDURE — 11102 TANGNTL BX SKIN SINGLE LES: CPT | Performed by: DERMATOLOGY

## 2020-11-12 NOTE — PATIENT INSTRUCTIONS

## 2020-11-12 NOTE — PROGRESS NOTES
[facility]  Fernando Rojo MD  [address]  1960    64 y.o. female     Date of Visit: 11/12/2020    Chief Complaint: lesion  Chief Complaint   Patient presents with    Biopsy     left cheek-picture sent through [employer]     Last seen: 7-2020    History of Present Illness:  1. Here for a non-healing lesion on the L cheek x ~ 2 mos. Overall asx. Her daughter Rowena Matute had a pigmented lesion on the upper back treated as MM in situ (2014). Review of Systems:  Gen: Feels well, good sense of health. Skin: No changing moles or lesions. Past Medical History, Family History, Surgical History, Medications and Allergies reviewed. Past Medical History:   Diagnosis Date    Hypertension     Hypothyroidism        Past Surgical History:   Procedure Laterality Date    APPENDECTOMY  2010    YARIEL AND BSO  2011    fibroid    URETER REVISION  9/2011    Dr. Guille Ray       Outpatient Medications Marked as Taking for the 11/12/20 encounter (Office Visit) with Kera Chandler MD   Medication Sig Dispense Refill    losartan (COZAAR) 100 MG tablet TAKE 1 TABLET DAILY 90 tablet 3    SYNTHROID 75 MCG tablet TAKE 1 TABLET DAILY 90 tablet 3    amLODIPine (NORVASC) 5 MG tablet TAKE 1 TABLET DAILY 90 tablet 3    tretinoin (RETIN-A) 0.025 % cream Apply a pea sized amount to the face QHS 20 g 4    Omega 3-6-9 Fatty Acids (OMEGA 3-6-9 COMPLEX PO) Take  by mouth 2 times daily.  therapeutic multivitamin-minerals (THERAGRAN-M) tablet Take 1 tablet by mouth daily.          Allergies   Allergen Reactions    Adhesive Tape      Pt had a burn around edges of bandage used with surgery in sept 2011 (compression type bandage    Sulfa Antibiotics Hives    Bactrim [Sulfamethoxazole-Trimethoprim] Rash    Lisinopril      cough       Physical Examination     Gen, well-appearing    L cheek with ulcerated/scabbed thin papule with rolled borders            Assessment and Plan     L cheek - r/o BCC  - Shave biopsy performed after verbal consent obtained. Patient educated regarding risk of bleeding, infection, scar and educated on wound care. Skin cleansed with alcohol pad and site anesthetized with lido + epi. Aluminum chloride applied to site for hemostasis. Petrolatum ointment and bandage applied. Specimen bottle labeled with patient information and site and specimen sent to [facility].

## 2020-11-18 LAB — DERMATOLOGY PATHOLOGY REPORT: NORMAL

## 2021-06-17 NOTE — PROGRESS NOTES
Subjective:      Patient ID: Shahida Palmer is a 64 y.o. female is here for her annual wellness exam.   The primary encounter diagnosis was Well adult exam. Diagnoses of Essential hypertension and Acquired hypothyroidism were also pertinent to this visit. HPI    PAP: not indicated. YARIEL/BSO 2011 for fibroid  Mammogram: normal 5/8/20. FH negative for breast cancer  Colonoscopy: negative 10/29/12. Due next year. Vaccines:  up-to-date   Diet:  Eating well  Exercise:  Doing strength training. Sleeps well     Hypertension: Patient here for follow-up of elevated blood pressure. She is exercising and is adherent to low salt diet. Blood pressure is well controlled at home. Cardiac symptoms none. Health Maintenance   Topic Date Due    COVID-19 Vaccine (1) Never done    TSH testing  06/11/2021    Potassium monitoring  06/11/2021    Creatinine monitoring  06/11/2021    DTaP/Tdap/Td vaccine (2 - Td or Tdap) 10/04/2021    Breast cancer screen  05/08/2022    Colon cancer screen colonoscopy  10/29/2022    Lipid screen  06/11/2025    Flu vaccine  Completed    Shingles Vaccine  Completed    Hepatitis C screen  Completed    HIV screen  Completed    Hepatitis A vaccine  Aged Out    Hepatitis B vaccine  Aged Out    Hib vaccine  Aged Out    Meningococcal (ACWY) vaccine  Aged Out    Pneumococcal 0-64 years Vaccine  Aged Out           Outpatient Medications Marked as Taking for the 6/18/21 encounter (Office Visit) with Grady Wilkinson MD   Medication Sig Dispense Refill    losartan (COZAAR) 100 MG tablet TAKE 1 TABLET DAILY 90 tablet 3    SYNTHROID 75 MCG tablet TAKE 1 TABLET DAILY 90 tablet 3    amLODIPine (NORVASC) 5 MG tablet TAKE 1 TABLET DAILY 90 tablet 3    tretinoin (RETIN-A) 0.025 % cream Apply a pea sized amount to the face QHS 20 g 4    Omega 3-6-9 Fatty Acids (OMEGA 3-6-9 COMPLEX PO) Take  by mouth 2 times daily.       therapeutic multivitamin-minerals (THERAGRAN-M) tablet Take 1 tablet by mouth daily. Allergies   Allergen Reactions    Adhesive Tape      Pt had a burn around edges of bandage used with surgery in sept 2011 (compression type bandage    Sulfa Antibiotics Hives    Bactrim [Sulfamethoxazole-Trimethoprim] Rash    Lisinopril      cough       Patient Active Problem List   Diagnosis    Hypothyroidism    Rosacea    Hypertension        Past Medical History:   Diagnosis Date    Hypertension     Hypothyroidism        Past Surgical History:   Procedure Laterality Date    APPENDECTOMY  2010    YARIEL AND BSO  2011    fibroid    URETER REVISION  9/2011    Dr. Kimo Dorsey       Social History     Tobacco Use    Smoking status: Never Smoker    Smokeless tobacco: Never Used   Vaping Use    Vaping Use: Never used   Substance Use Topics    Alcohol use: Yes     Alcohol/week: 1.7 standard drinks     Types: 2 Standard drinks or equivalent per week    Drug use: No       Family History   Problem Relation Age of Onset    Hypertension Mother     Thyroid Cancer Mother 76        anaplastic    Rashes/Skin Problems Mother     Heart Disease Father         valve    Rashes/Skin Problems Other        Review of Systems  Review of Systems   Constitutional: Negative for activity change, appetite change, fatigue and unexpected weight change. HENT: Negative for congestion, hearing loss, nosebleeds, sore throat, tinnitus, trouble swallowing and voice change. Eyes: Negative for visual disturbance. Respiratory: Negative for choking, chest tightness, shortness of breath and wheezing. Cardiovascular: Negative for chest pain, palpitations and leg swelling. Gastrointestinal: Negative for abdominal pain, anal bleeding, blood in stool, constipation, diarrhea and nausea. Genitourinary: Negative for dysuria, flank pain, frequency, hematuria, pelvic pain, vaginal bleeding and vaginal discharge. Musculoskeletal: Negative for arthralgias and myalgias. Skin: Negative for color change and rash. Neurological: Negative for light-headedness and headaches. Hematological: Does not bruise/bleed easily. Psychiatric/Behavioral: Negative for dysphoric mood and sleep disturbance. The patient is not nervous/anxious.         Lab Results   Component Value Date     06/11/2020    K 4.1 06/11/2020    CL 99 06/11/2020    CO2 27 06/11/2020    BUN 13 06/11/2020    CREATININE 0.7 06/11/2020    GLUCOSE 96 06/11/2020    CALCIUM 9.5 06/11/2020    PROT 7.1 06/11/2020    LABALBU 4.6 06/11/2020    BILITOT 0.8 06/11/2020    ALKPHOS 51 06/11/2020    AST 21 06/11/2020    ALT 26 06/11/2020    LABGLOM >60 06/11/2020    GFRAA >60 06/11/2020    AGRATIO 1.8 06/11/2020    GLOB 2.5 06/11/2020        Lab Results   Component Value Date    WBC 5.0 06/11/2020    HGB 15.1 06/11/2020    HCT 45.9 06/11/2020    MCV 93.4 06/11/2020     06/11/2020     TSH   Date Value Ref Range Status   06/11/2020 3.15 0.27 - 4.20 uIU/mL Final   06/12/2019 2.28 0.27 - 4.20 uIU/mL Final   06/08/2018 3.55 0.27 - 4.20 uIU/mL Final   05/16/2013 1.97 uIU/mL Final   06/19/2012 2.42 0.35 - 5.5 uIU/ml Final   10/04/2011 3.42 0.35 - 5.5 uIU/ml Final   12/16/2010 2.23 0.35 - 5.5 uIU/ml Final     Lab Results   Component Value Date    CHOL 184 06/11/2020    CHOL 202 (H) 06/12/2019    CHOL 207 (H) 06/08/2018     Lab Results   Component Value Date    TRIG 94 06/11/2020    TRIG 84 06/12/2019    TRIG 84 06/08/2018     Lab Results   Component Value Date    HDL 65 (H) 06/11/2020    HDL 83 (H) 06/12/2019    HDL 86 (H) 06/08/2018     Lab Results   Component Value Date    LDLCALC 100 (H) 06/11/2020    LDLCALC 102 (H) 06/12/2019    LDLCALC 104 (H) 06/08/2018     Lab Results   Component Value Date    LABVLDL 19 06/11/2020    LABVLDL 17 06/12/2019    LABVLDL 17 06/08/2018     No results found for: CHOLHDLRATIO    Objective:   Physical Exam  .  Wt Readings from Last 3 Encounters:   06/18/21 190 lb (86.2 kg)   06/12/19 189 lb 8 oz (86 kg)   12/10/18 189 lb 8 oz (86 kg) Temp Readings from Last 3 Encounters:   06/18/21 97.8 °F (36.6 °C) (Temporal)   11/12/20 97.7 °F (36.5 °C)   07/21/20 97.9 °F (36.6 °C)     BP Readings from Last 3 Encounters:   06/18/21 132/84   06/12/19 128/80   12/10/18 138/80     Pulse Readings from Last 3 Encounters:   06/18/21 64   06/12/19 74   12/10/18 76        Physical Exam  Constitutional:       Appearance: Normal appearance. She is well-developed. HENT:      Head: Normocephalic and atraumatic. Right Ear: Hearing, tympanic membrane, ear canal and external ear normal.      Left Ear: Hearing, tympanic membrane, ear canal and external ear normal.      Nose: Nose normal.   Eyes:      General: Lids are normal.      Conjunctiva/sclera: Conjunctivae normal.   Neck:      Thyroid: No thyroid mass or thyromegaly. Trachea: Trachea normal.   Cardiovascular:      Rate and Rhythm: Normal rate and regular rhythm. Pulses: Normal pulses. Heart sounds: Normal heart sounds. No murmur heard. Pulmonary:      Effort: Pulmonary effort is normal.      Breath sounds: Normal breath sounds. Abdominal:      General: Bowel sounds are normal.      Palpations: Abdomen is soft. Tenderness: There is no abdominal tenderness. Hernia: No hernia is present. Musculoskeletal:      Cervical back: Neck supple. Lymphadenopathy:      Head:      Right side of head: No submandibular adenopathy. Left side of head: No submandibular adenopathy. Cervical: No cervical adenopathy. Skin:     General: Skin is warm and dry. Findings: No lesion or rash. Comments: No abnormal appearing lesions on exposed skin   Neurological:      Mental Status: She is alert and oriented to person, place, and time. Gait: Gait normal.      Deep Tendon Reflexes:      Reflex Scores:       Patellar reflexes are 2+ on the right side and 2+ on the left side.   Psychiatric:         Speech: Speech normal.         Behavior: Behavior normal.         Judgment: Judgment normal.           Assessment and Plan       Diagnosis Orders   1. Well adult exam  Comprehensive Metabolic Panel    TSH with Reflex    Lipid Panel    CBC Auto Differential  Discussed diet, exercise   Calcium and Vitamin D addressed  PAP Not indicated; discussed and she is comfortable with this  Annual to biannual mammogram  Annual influenza vaccine  Dt every 10 years  Colonoscopy every 10 years until age 78         2. Essential hypertension  losartan (COZAAR) 100 MG tablet    amLODIPine (NORVASC) 5 MG tablet  At goal < 130/80   3. Acquired hypothyroidism  SYNTHROID 75 MCG tablet        . Side effects of current medications reviewed and questions answered. Follow up in 1 year or prn.

## 2021-06-18 ENCOUNTER — OFFICE VISIT (OUTPATIENT)
Dept: FAMILY MEDICINE CLINIC | Age: 61
End: 2021-06-18
Payer: COMMERCIAL

## 2021-06-18 VITALS
DIASTOLIC BLOOD PRESSURE: 80 MMHG | SYSTOLIC BLOOD PRESSURE: 124 MMHG | WEIGHT: 190 LBS | RESPIRATION RATE: 12 BRPM | TEMPERATURE: 97.8 F | BODY MASS INDEX: 28.79 KG/M2 | HEART RATE: 64 BPM | HEIGHT: 68 IN | OXYGEN SATURATION: 99 %

## 2021-06-18 DIAGNOSIS — I10 ESSENTIAL HYPERTENSION: ICD-10-CM

## 2021-06-18 DIAGNOSIS — Z00.00 WELL ADULT EXAM: ICD-10-CM

## 2021-06-18 DIAGNOSIS — Z23 NEED FOR TDAP VACCINATION: ICD-10-CM

## 2021-06-18 DIAGNOSIS — Z00.00 WELL ADULT EXAM: Primary | ICD-10-CM

## 2021-06-18 DIAGNOSIS — E03.9 ACQUIRED HYPOTHYROIDISM: ICD-10-CM

## 2021-06-18 LAB
A/G RATIO: 1.7 (ref 1.1–2.2)
ALBUMIN SERPL-MCNC: 4.6 G/DL (ref 3.4–5)
ALP BLD-CCNC: 56 U/L (ref 40–129)
ALT SERPL-CCNC: 28 U/L (ref 10–40)
ANION GAP SERPL CALCULATED.3IONS-SCNC: 16 MMOL/L (ref 3–16)
AST SERPL-CCNC: 21 U/L (ref 15–37)
BASOPHILS ABSOLUTE: 0 K/UL (ref 0–0.2)
BASOPHILS RELATIVE PERCENT: 0.5 %
BILIRUB SERPL-MCNC: 0.6 MG/DL (ref 0–1)
BUN BLDV-MCNC: 16 MG/DL (ref 7–20)
CALCIUM SERPL-MCNC: 9.3 MG/DL (ref 8.3–10.6)
CHLORIDE BLD-SCNC: 104 MMOL/L (ref 99–110)
CHOLESTEROL, TOTAL: 215 MG/DL (ref 0–199)
CO2: 22 MMOL/L (ref 21–32)
CREAT SERPL-MCNC: 0.8 MG/DL (ref 0.6–1.1)
EOSINOPHILS ABSOLUTE: 0.1 K/UL (ref 0–0.6)
EOSINOPHILS RELATIVE PERCENT: 1.9 %
GFR AFRICAN AMERICAN: >60
GFR NON-AFRICAN AMERICAN: >60
GLOBULIN: 2.7 G/DL
GLUCOSE BLD-MCNC: 96 MG/DL (ref 70–99)
HCT VFR BLD CALC: 44.6 % (ref 36–48)
HDLC SERPL-MCNC: 66 MG/DL (ref 40–60)
HEMOGLOBIN: 15 G/DL (ref 12–16)
LDL CHOLESTEROL CALCULATED: 133 MG/DL
LYMPHOCYTES ABSOLUTE: 2.1 K/UL (ref 1–5.1)
LYMPHOCYTES RELATIVE PERCENT: 40.7 %
MCH RBC QN AUTO: 31.2 PG (ref 26–34)
MCHC RBC AUTO-ENTMCNC: 33.7 G/DL (ref 31–36)
MCV RBC AUTO: 92.6 FL (ref 80–100)
MONOCYTES ABSOLUTE: 0.5 K/UL (ref 0–1.3)
MONOCYTES RELATIVE PERCENT: 10.5 %
NEUTROPHILS ABSOLUTE: 2.4 K/UL (ref 1.7–7.7)
NEUTROPHILS RELATIVE PERCENT: 46.4 %
PDW BLD-RTO: 12.5 % (ref 12.4–15.4)
PLATELET # BLD: 235 K/UL (ref 135–450)
PMV BLD AUTO: 9.2 FL (ref 5–10.5)
POTASSIUM SERPL-SCNC: 4 MMOL/L (ref 3.5–5.1)
RBC # BLD: 4.82 M/UL (ref 4–5.2)
SODIUM BLD-SCNC: 142 MMOL/L (ref 136–145)
TOTAL PROTEIN: 7.3 G/DL (ref 6.4–8.2)
TRIGL SERPL-MCNC: 81 MG/DL (ref 0–150)
TSH REFLEX: 2.6 UIU/ML (ref 0.27–4.2)
VLDLC SERPL CALC-MCNC: 16 MG/DL
WBC # BLD: 5.1 K/UL (ref 4–11)

## 2021-06-18 PROCEDURE — 99396 PREV VISIT EST AGE 40-64: CPT | Performed by: FAMILY MEDICINE

## 2021-06-18 PROCEDURE — 90715 TDAP VACCINE 7 YRS/> IM: CPT | Performed by: FAMILY MEDICINE

## 2021-06-18 PROCEDURE — 90471 IMMUNIZATION ADMIN: CPT | Performed by: FAMILY MEDICINE

## 2021-06-18 RX ORDER — LOSARTAN POTASSIUM 100 MG/1
TABLET ORAL
Qty: 90 TABLET | Refills: 3 | Status: SHIPPED | OUTPATIENT
Start: 2021-06-18 | End: 2022-07-20 | Stop reason: SDUPTHER

## 2021-06-18 RX ORDER — LEVOTHYROXINE SODIUM 75 MCG
TABLET ORAL
Qty: 90 TABLET | Refills: 3 | Status: SHIPPED | OUTPATIENT
Start: 2021-06-18 | End: 2022-07-21 | Stop reason: SDUPTHER

## 2021-06-18 RX ORDER — AMLODIPINE BESYLATE 5 MG/1
TABLET ORAL
Qty: 90 TABLET | Refills: 3 | Status: SHIPPED | OUTPATIENT
Start: 2021-06-18 | End: 2022-07-20 | Stop reason: SDUPTHER

## 2021-06-18 SDOH — ECONOMIC STABILITY: FOOD INSECURITY: WITHIN THE PAST 12 MONTHS, THE FOOD YOU BOUGHT JUST DIDN'T LAST AND YOU DIDN'T HAVE MONEY TO GET MORE.: NEVER TRUE

## 2021-06-18 SDOH — ECONOMIC STABILITY: FOOD INSECURITY: WITHIN THE PAST 12 MONTHS, YOU WORRIED THAT YOUR FOOD WOULD RUN OUT BEFORE YOU GOT MONEY TO BUY MORE.: NEVER TRUE

## 2021-06-18 ASSESSMENT — ENCOUNTER SYMPTOMS
SHORTNESS OF BREATH: 0
VOICE CHANGE: 0
TROUBLE SWALLOWING: 0
WHEEZING: 0
BLOOD IN STOOL: 0
COLOR CHANGE: 0
CHOKING: 0
CHEST TIGHTNESS: 0
ANAL BLEEDING: 0
DIARRHEA: 0
ABDOMINAL PAIN: 0
CONSTIPATION: 0
NAUSEA: 0
SORE THROAT: 0

## 2021-06-18 ASSESSMENT — SOCIAL DETERMINANTS OF HEALTH (SDOH): HOW HARD IS IT FOR YOU TO PAY FOR THE VERY BASICS LIKE FOOD, HOUSING, MEDICAL CARE, AND HEATING?: NOT HARD AT ALL

## 2021-06-18 ASSESSMENT — PATIENT HEALTH QUESTIONNAIRE - PHQ9
2. FEELING DOWN, DEPRESSED OR HOPELESS: 0
SUM OF ALL RESPONSES TO PHQ QUESTIONS 1-9: 0
SUM OF ALL RESPONSES TO PHQ9 QUESTIONS 1 & 2: 0
1. LITTLE INTEREST OR PLEASURE IN DOING THINGS: 0

## 2021-07-06 ENCOUNTER — TELEPHONE (OUTPATIENT)
Dept: FAMILY MEDICINE CLINIC | Age: 61
End: 2021-07-06

## 2021-07-06 NOTE — TELEPHONE ENCOUNTER
----- Message from Colleen Fleischer sent at 7/6/2021  8:49 AM EDT -----  Subject: Message to Provider    QUESTIONS  Information for Provider? pt wants to know if she should have an appt . pt   has low fever deep cough sore throat and phlegm for about 3 days cloudy   snot also please call to advise about if appt or what to help pt   ---------------------------------------------------------------------------  --------------  CALL BACK INFO  What is the best way for the office to contact you? OK to leave message on   voicemail  Preferred Call Back Phone Number? [phone]  ---------------------------------------------------------------------------  --------------  SCRIPT ANSWERS  Relationship to Patient?  Self

## 2021-07-06 NOTE — TELEPHONE ENCOUNTER
I would be happy to see her if she wants. Likely has a cold. Colds last 7 to 10 days and don't require antibiotics. She can try Advil, over-the-counter cough syrup such as Delsym.

## 2021-07-08 ENCOUNTER — TELEPHONE (OUTPATIENT)
Dept: FAMILY MEDICINE CLINIC | Age: 61
End: 2021-07-08

## 2021-07-08 ENCOUNTER — VIRTUAL VISIT (OUTPATIENT)
Dept: FAMILY MEDICINE CLINIC | Age: 61
End: 2021-07-08
Payer: COMMERCIAL

## 2021-07-08 DIAGNOSIS — J06.9 VIRAL URI: Primary | ICD-10-CM

## 2021-07-08 PROCEDURE — 99213 OFFICE O/P EST LOW 20 MIN: CPT | Performed by: FAMILY MEDICINE

## 2021-07-08 RX ORDER — GUAIFENESIN AND CODEINE PHOSPHATE 100; 10 MG/5ML; MG/5ML
5-10 SOLUTION ORAL 4 TIMES DAILY PRN
Qty: 118 ML | Refills: 0 | Status: SHIPPED | OUTPATIENT
Start: 2021-07-08 | End: 2021-07-15

## 2021-07-08 NOTE — PROGRESS NOTES
2021    TELEHEALTH EVALUATION -- Audio/Visual (During ACWGK-27 public health emergency)    HPI:    Vicenta Avendano (:  1960) has requested an audio/video evaluation for the following concern(s):    Fever and URI    Zak Lloyd complains of a URI x 5 days. Hacking cough, low grade temp to 100. Does not feel she short of breath and no chest pain.  + nasal congestion and PND. Nasal discharge is white. No ear pain. Throat is irritated. No nausea, vomiting, diarrhea    Symptoms are not getting better or worse. Rx: Ibuprofen    Lab Results   Component Value Date     2021    K 4.0 2021     2021    CO2 22 2021    BUN 16 2021    CREATININE 0.8 2021    GLUCOSE 96 2021    CALCIUM 9.3 2021         Review of Systems    Outpatient Medications Marked as Taking for the 21 encounter (Virtual Visit) with Andrea Littlejohn MD   Medication Sig Dispense Refill    SYNTHROID 75 MCG tablet TAKE 1 TABLET DAILY 90 tablet 3    losartan (COZAAR) 100 MG tablet TAKE 1 TABLET DAILY 90 tablet 3    amLODIPine (NORVASC) 5 MG tablet TAKE 1 TABLET DAILY 90 tablet 3    tretinoin (RETIN-A) 0.025 % cream Apply a pea sized amount to the face QHS 20 g 4    Omega 3-6-9 Fatty Acids (OMEGA 3-6-9 COMPLEX PO) Take  by mouth 2 times daily.  therapeutic multivitamin-minerals (THERAGRAN-M) tablet Take 1 tablet by mouth daily. Social History     Tobacco Use    Smoking status: Never Smoker    Smokeless tobacco: Never Used   Vaping Use    Vaping Use: Never used   Substance Use Topics    Alcohol use:  Yes     Alcohol/week: 1.7 standard drinks     Types: 2 Standard drinks or equivalent per week    Drug use: No        Allergies   Allergen Reactions    Adhesive Tape      Pt had a burn around edges of bandage used with surgery in 2011 (compression type bandage    Sulfa Antibiotics Hives    Bactrim [Sulfamethoxazole-Trimethoprim] Rash    Lisinopril      cough   , Past Medical History:   Diagnosis Date    Hypertension     Hypothyroidism        PHYSICAL EXAMINATION:  [ INSTRUCTIONS:  \"[x]\" Indicates a positive item  \"[]\" Indicates a negative item  -- DELETE ALL ITEMS NOT EXAMINED]  Vital Signs: (As obtained by patient/caregiver or practitioner observation)    Blood pressure-  Heart rate-    Respiratory rate-    Temperature- 99.5 Pulse oximetry-   Wt 189 lb    Wt Readings from Last 3 Encounters:   06/18/21 190 lb (86.2 kg)   06/12/19 189 lb 8 oz (86 kg)   12/10/18 189 lb 8 oz (86 kg)     Temp Readings from Last 3 Encounters:   06/18/21 97.8 °F (36.6 °C) (Temporal)   11/12/20 97.7 °F (36.5 °C)   07/21/20 97.9 °F (36.6 °C)     BP Readings from Last 3 Encounters:   06/18/21 124/80   06/12/19 128/80   12/10/18 138/80     Pulse Readings from Last 3 Encounters:   06/18/21 64   06/12/19 74   12/10/18 76        Constitutional: [x] Appears well-developed and well-nourished [x] No apparent distress      [] Abnormal-    Hoarse voice, congested  Mental status  [x] Alert and awake  [x] Oriented to person/place/time [x]Able to follow commands      Eyes:  EOM    [x]  Normal  [] Abnormal-  Sclera  [x]  Normal  [] Abnormal -         Discharge [x]  None visible  [] Abnormal -    HENT:   [x] Normocephalic, atraumatic. [] Abnormal   Neck: [x] No visualized mass     Pulmonary/Chest: [x] Respiratory effort normal.  [x] No visualized signs of difficulty breathing or respiratory distress        [] Abnormal-     Neurological:        [x] No Facial Asymmetry (Cranial nerve 7 motor function) (limited exam to video visit)          [] No gaze palsy        [] Abnormal-         Skin:        [x] No significant exanthematous lesions or discoloration noted on facial skin         [] Abnormal-            Psychiatric:       [x] Normal Affect        [] Abnormal-     Other pertinent observable physical exam findings-     ASSESSMENT/PLAN:  1. Viral URI  PDMP reviewed and no concerns noted.     I have recommended the following symptomatic treatment for influenza or viral illness: push fluids, use Tylenol or OTC NSAID's (Advil, Aleve etc) for fever or achiness, and rest.   The symptoms usually resolve in 7 days. Call if symptoms persist or develops new symptoms such as wheezing or shortness of breath. Side effects of current medications reviewed and questions answered. - guaiFENesin-codeine (CHERATUSSIN AC) 100-10 MG/5ML syrup; Take 5-10 mLs by mouth 4 times daily as needed for Cough for up to 7 days. Dispense: 118 mL; Refill: 0      No follow-ups on file. Eligio Lefort, was evaluated through a synchronous (real-time) audio-video encounter. The patient (or guardian if applicable) is aware that this is a billable service. Verbal consent to proceed has been obtained within the past 12 months. The visit was conducted pursuant to the emergency declaration under the [address] [place] [address], [address] [place] [address] authority and the [employer] Act. Patient identification was verified, and a caregiver was present when appropriate. The patient was located in a state where the provider was credentialed to provide care. Total time spent on this encounter: Not billed by time    --Nora Coello MD on 7/8/2021 at 11:48 AM    An electronic signature was used to authenticate this note.

## 2021-07-22 ENCOUNTER — OFFICE VISIT (OUTPATIENT)
Dept: DERMATOLOGY | Age: 61
End: 2021-07-22
Payer: COMMERCIAL

## 2021-07-22 VITALS — TEMPERATURE: 97.4 F

## 2021-07-22 DIAGNOSIS — Z80.8 FAMILY HISTORY OF MALIGNANT MELANOMA: ICD-10-CM

## 2021-07-22 DIAGNOSIS — L57.0 AK (ACTINIC KERATOSIS): ICD-10-CM

## 2021-07-22 DIAGNOSIS — L82.0 INFLAMED SEBORRHEIC KERATOSIS: ICD-10-CM

## 2021-07-22 DIAGNOSIS — D22.9 MULTIPLE NEVI: Primary | ICD-10-CM

## 2021-07-22 DIAGNOSIS — L30.9 DERMATITIS: ICD-10-CM

## 2021-07-22 DIAGNOSIS — D23.9 DERMATOFIBROMA: ICD-10-CM

## 2021-07-22 DIAGNOSIS — L70.0 ACNE VULGARIS: ICD-10-CM

## 2021-07-22 PROCEDURE — 99213 OFFICE O/P EST LOW 20 MIN: CPT | Performed by: DERMATOLOGY

## 2021-07-22 PROCEDURE — 17110 DESTRUCTION B9 LES UP TO 14: CPT | Performed by: DERMATOLOGY

## 2021-07-22 NOTE — PROGRESS NOTES
[facility]  Micheal Ledezma MD  [address]  1960    64 y.o. female     Date of Visit: 7/22/2021    Chief Complaint: moles/lesions  Chief Complaint   Patient presents with    Skin Exam     Last seen: 7-2020    History of Present Illness:  1. Here for full skin check; has scattered asx moles on the trunk and extremities, many present since child/young adult; no change in s/s/c of any lesions; no bleeding lesions. No personal hx of skin cancer. No family hx of skin cancer except for daughter with hx of atypical pigmented lesion/MM in situ as noted below. Wears sunscreen regularly. 2. F/u for several year hx of erythema and scaling involving the brows. It occurs intermittently and is worse in the winter. She again notes that she clears completely intermittently. She has tried HC 2.5 oint and elidel in the past w/o improvement. Previously the central part of her brows were flared. She picks at the area. Denies rough spots/scaling elsewhere on the face or body. Lateral part of brows flared minimally and she feels like they are better and not bothersome. 3. F/u mild comedonal acne and milia. She had used atralin for awhile after a previous visit but has not been using anything recently. Has been getting dermaplaning, peels or hydrafacials and feels that this helps. 4. F/u AK's-  No new concerns. 5. S/p bx of the L cheek  - read as ISK. Still with peeling/dry lesion here - picking at it. Her daughter Daysi Osborne had a pigmented lesion on the upper back treated as MM in situ (2014). Review of Systems:  Gen: Feels well, good sense of health. Skin: No changing moles or lesions. Past Medical History, Family History, Surgical History, Medications and Allergies reviewed.     Past Medical History:   Diagnosis Date    Hypertension     Hypothyroidism        Past Surgical History:   Procedure Laterality Date    APPENDECTOMY  2010    YARIEL AND BSO  2011    fibroid    URETER REVISION  9/2011    Dr. Jose Juan Hogue       Outpatient Medications Marked as Taking for the 7/22/21 encounter (Office Visit) with Bonnie Carrasquillo MD   Medication Sig Dispense Refill    SYNTHROID 75 MCG tablet TAKE 1 TABLET DAILY 90 tablet 3    losartan (COZAAR) 100 MG tablet TAKE 1 TABLET DAILY 90 tablet 3    amLODIPine (NORVASC) 5 MG tablet TAKE 1 TABLET DAILY 90 tablet 3    tretinoin (RETIN-A) 0.025 % cream Apply a pea sized amount to the face QHS 20 g 4    Omega 3-6-9 Fatty Acids (OMEGA 3-6-9 COMPLEX PO) Take  by mouth 2 times daily.  therapeutic multivitamin-minerals (THERAGRAN-M) tablet Take 1 tablet by mouth daily. Allergies   Allergen Reactions    Adhesive Tape      Pt had a burn around edges of bandage used with surgery in sept 2011 (compression type bandage    Sulfa Antibiotics Hives    Bactrim [Sulfamethoxazole-Trimethoprim] Rash    Lisinopril      cough       Physical Examination     Gen, well-appearing  The following were examined and determined to be normal: Psych/Neuro, Scalp/hair, Conjunctivae/eyelids, Gums/teeth/lips, Neck, Breast/axilla/chest, Abdomen, Back, RUE, LUE, RLE, LLE, Nails/digits and buttocks. The following were examined and determined to be abnormal: Head/face. 1. trunk and extremities with scattered brown macules and papules   R medial shin with firm dermal pinkish-brown papule   2. Lateral brows with scaling and mild erythema and scale (variably has been both medial and lateral or central brows)  3. Face fairly clear - no inflammatory acne today  4. No AK's  5. L cheek with eroded dry pinkish macule/thin papule    Assessment and Plan     1a. Benign-appearing nevi and family hx of melanoma (daughter)\  1b. DF - R medial shin  - educ re ABCD's of MM   educ sun protection SPF 30+  encouraged skin check 1-2 years (sooner if indicated), self checks    2.  Rough and scaly skin in brows - ? seb derm (brows) with excoriations, mild today vs ulerythema ophryogenes  - previously rec HC oint or elidel bid prn flares but she deferred trx  - ed re-eval if no improvement or worsening - consider bx but variable areas have been involved and limited trx options     3. Comedonal acne, well-controlled  - can resume tretinoin qhs if needed; ed irritation photosen  - plans to cont peels and dermaplaning or hydrafacials    4. AK's - clear today  - cont sun protection    5. *bx of the L cheek  - read as ISK -1  - lesion(s) treated with liquid nitrogen x 2 cycles. Patient educated on risk of blister, hypopigmentation/scar and wound care. - avoid picking    Also briefly discussed - dry skin near eyes, prominence of eyes and is hypothyroid. She doesn't feel that eyes are any more prominent/bulging than they always have been. Is considering bleph - rec eval at [facility] and ? Possibility of Graves.

## 2021-12-01 ENCOUNTER — E-VISIT (OUTPATIENT)
Dept: FAMILY MEDICINE CLINIC | Age: 61
End: 2021-12-01
Payer: COMMERCIAL

## 2021-12-01 DIAGNOSIS — S39.012A STRAIN OF LUMBAR REGION, INITIAL ENCOUNTER: Primary | ICD-10-CM

## 2021-12-01 PROCEDURE — 99421 OL DIG E/M SVC 5-10 MIN: CPT | Performed by: FAMILY MEDICINE

## 2021-12-01 RX ORDER — METHOCARBAMOL 750 MG/1
750 TABLET, FILM COATED ORAL 4 TIMES DAILY
Qty: 40 TABLET | Refills: 0 | Status: SHIPPED | OUTPATIENT
Start: 2021-12-01 | End: 2021-12-11

## 2021-12-01 NOTE — PROGRESS NOTES
Patient Active Problem List   Diagnosis    Hypothyroidism    Rosacea    Hypertension      Past Surgical History:   Procedure Laterality Date    APPENDECTOMY  2010    YARIEL AND BSO  2011    fibroid    URETER REVISION  9/2011    Dr. Millicent Em      Pt had a burn around edges of bandage used with surgery in sept 2011 (compression type bandage    Sulfa Antibiotics Hives    Bactrim [Sulfamethoxazole-Trimethoprim] Rash    Lisinopril      cough      Current Outpatient Medications on File Prior to Visit   Medication Sig Dispense Refill    SYNTHROID 75 MCG tablet TAKE 1 TABLET DAILY 90 tablet 3    losartan (COZAAR) 100 MG tablet TAKE 1 TABLET DAILY 90 tablet 3    amLODIPine (NORVASC) 5 MG tablet TAKE 1 TABLET DAILY 90 tablet 3    tretinoin (RETIN-A) 0.025 % cream Apply a pea sized amount to the face QHS 20 g 4    Omega 3-6-9 Fatty Acids (OMEGA 3-6-9 COMPLEX PO) Take  by mouth 2 times daily.  therapeutic multivitamin-minerals (THERAGRAN-M) tablet Take 1 tablet by mouth daily. No current facility-administered medications on file prior to visit. Diagnosis Orders   1. Strain of lumbar region, initial encounter  methocarbamol (ROBAXIN-750) 750 MG tablet      5-10 minutes were spent on the digital evaluation and management of this patient.

## 2022-05-26 ENCOUNTER — OFFICE VISIT (OUTPATIENT)
Dept: DERMATOLOGY | Age: 62
End: 2022-05-26
Payer: COMMERCIAL

## 2022-05-26 DIAGNOSIS — D23.9 DERMATOFIBROMA: ICD-10-CM

## 2022-05-26 DIAGNOSIS — L70.0 ACNE VULGARIS: ICD-10-CM
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
## 2022-05-26 DIAGNOSIS — L57.0 AK (ACTINIC KERATOSIS): ICD-10-CM

## 2022-05-26 DIAGNOSIS — L72.0 EPIDERMOID CYST: ICD-10-CM

## 2022-05-26 DIAGNOSIS — Z80.8 FAMILY HISTORY OF MALIGNANT MELANOMA: ICD-10-CM

## 2022-05-26 DIAGNOSIS — L82.1 SEBORRHEIC KERATOSIS: ICD-10-CM

## 2022-05-26 DIAGNOSIS — D22.9 MULTIPLE NEVI: Primary | ICD-10-CM

## 2022-05-26 PROCEDURE — 99214 OFFICE O/P EST MOD 30 MIN: CPT | Performed by: DERMATOLOGY

## 2022-05-26 NOTE — PROGRESS NOTES
[facility]  Annia Morrow MD  [address]  1960    63 y.o. female     Date of Visit: 5/26/2022    Chief Complaint: moles/lesions  Chief Complaint   Patient presents with    Skin Exam     Last seen: 7-2021  *Oldest daughter [place] is having a baby boy the summer - 2022    History of Present Illness:  1. Here for full skin check; has scattered asx moles on the trunk and extremities, many present since child/young adult; no change in s/s/c of any lesions; no bleeding lesions. No personal hx of skin cancer. Daughter with hx of atypical pigmented lesion/MM in situ as noted below. Wears sunscreen regularly. 2. F/u for several year hx of erythema and scaling involving the brows. It occurs intermittently and is worse in the winter. She again notes that she clears completely intermittently. She has tried HC 2.5 oint and elidel in the past w/o significant improvement. Previously the central part of her brows were flared. She picks at the area. Denies rough spots/scaling elsewhere on the face or body. Lateral part of brows flared minimally and she feels like they are better and not bothersome. Central area with some activity today. 3. F/u mild comedonal acne and milia. She had used atralin for awhile after a previous visit but has not been using anything recently. Has been getting dermaplaning, peels or hydrafacials and feels that this helps. 4. F/u AK's-  No new concerns. 5. S/p bx of the L cheek  - read as ISK. Treated with LN2 for residual lesion and this remains clear. 6.  She notes a bump at the back that she would like checked. Asymptomatic. 7.  She notes an erythematous itchy area in the right upper inner arm that appeared 2 weeks ago and thought it could be a bite but notes that she has not been outside much. Used over-the-counter hydrocortisone and has faded significantly longer symptomatic.   No lesions or eruption elsewhere. Her daughter Renee Blair had a pigmented lesion on the upper back treated as MM in situ (2014). Review of Systems:  Gen: Feels well, good sense of health. Skin: No changing moles or lesions. Past Medical History, Family History, Surgical History, Medications and Allergies reviewed. Past Medical History:   Diagnosis Date    Hypertension     Hypothyroidism        Past Surgical History:   Procedure Laterality Date    APPENDECTOMY  2010    YARIEL AND BSO  2011    fibroid    URETER REVISION  9/2011    Dr. Pilar Howe       Outpatient Medications Marked as Taking for the 5/26/22 encounter (Office Visit) with Arianna Bridges MD   Medication Sig Dispense Refill    SYNTHROID 75 MCG tablet TAKE 1 TABLET DAILY 90 tablet 3    losartan (COZAAR) 100 MG tablet TAKE 1 TABLET DAILY 90 tablet 3    amLODIPine (NORVASC) 5 MG tablet TAKE 1 TABLET DAILY 90 tablet 3       Allergies   Allergen Reactions    Adhesive Tape      Pt had a burn around edges of bandage used with surgery in sept 2011 (compression type bandage    Sulfa Antibiotics Hives    Bactrim [Sulfamethoxazole-Trimethoprim] Rash    Lisinopril      cough       Physical Examination     Gen, well-appearing  FSE today    1. trunk and extremities with scattered brown macules and papules   R medial shin with firm dermal pinkish-brown papule   2. Medial brows with mild scaling and mild erythema (variably has been both medial and lateral or central brows)  3. Face fairly clear - no inflammatory acne today  4. No AK's  5. L cheek clear  6. Upper back with small cystic papule with overlying punctum    Assessment and Plan     1a. Benign-appearing nevi and AK's + family hx of melanoma (daughter)  1b. DF - R medial shin  - monitor for ABCD's of MM   Cont sun protection SPF 30+  encouraged skin check 1-2 years (sooner if indicated), self checks    2.  Rough and scaly skin in brows - ? seb derm (brows) with excoriations vs ulerythema ophryogenes - stable overall  - rec HC oint or elidel bid prn flares but she has deferred trx in the past  - ed re-eval if no improvement or worsening - consider bx but variable areas have been involved and limited trx options     3. Comedonal acne, well-controlled  - can resume tretinoin qhs if needed; ed irritation photosen  - plans to cont peels and dermaplaning or hydrafacials    4. AK's - clear today  - cont sun protection    5. *bx of the L cheek  - read as ISK - - remains clear s/p LN2    6. Tiny epidermoid cyst on the back  -Reassured and discussed excision as needed if symptomatic or growing    7. Likely arthropod bite reaction-right upper inner arm but nearly resolved  - Reassured and reevaluate if new lesions/concerns  - TAC prn flares    Previously briefly discussed - dry skin near eyes, prominence of eyes and is hypothyroid. She doesn't feel that eyes are any more prominent/bulging than they always have been. Is considering bleph - rec eval at [facility] and ? Possibility of Graves.

## 2022-07-17 NOTE — PROGRESS NOTES
Subjective:      Patient ID: Kat Benjamin is a 63 y.o. female is here for her annual wellness exam.   The primary encounter diagnosis was Well adult exam. Diagnoses of Essential hypertension and Acquired hypothyroidism were also pertinent to this visit. HPI    PAP: Not indicated  S/P hysterectomy 2011 for fibroid  Mammogram:  normal 9/2021. FH neg for breast cancer. Colon cancer screening. Normal colonoscopy 5/2022.  10 year follow up recommended. FH neg for colon cancer and polyps. Vaccines; up-to-date. Had COVID vaccine x 3. Diet: eats well. Exercise: strength training, 2 times a week. Sleeps well. Hypertension: Patient here for follow-up of elevated blood pressure. She is exercising and is adherent to low salt diet. Blood pressure is not testing at home. Cardiac symptoms none. Patient denies chest pain, dyspnea, fatigue, lower extremity edema, and palpitations. Cardiovascular risk factors: none. Use of agents associated with hypertension: none. History of target organ damage: none.      Hypothyroid:  compliant with levothyroxine   Due TSH    Health Maintenance   Topic Date Due    COVID-19 Vaccine (4 - Booster for Pfizer series) 03/21/2022    Depression Screen  06/18/2022    Flu vaccine (1) 09/01/2022    Breast cancer screen  09/01/2023    Lipids  06/18/2026    DTaP/Tdap/Td vaccine (3 - Td or Tdap) 06/18/2031    Colorectal Cancer Screen  05/12/2032    Shingles vaccine  Completed    Hepatitis C screen  Completed    HIV screen  Completed    Hepatitis A vaccine  Aged Out    Hepatitis B vaccine  Aged Out    Hib vaccine  Aged Out    Meningococcal (ACWY) vaccine  Aged Out    Pneumococcal 0-64 years Vaccine  Aged Out           Outpatient Medications Marked as Taking for the 7/20/22 encounter (Office Visit) with Blaine Orlando MD   Medication Sig Dispense Refill    SYNTHROID 75 MCG tablet TAKE 1 TABLET DAILY 90 tablet 3    losartan (COZAAR) 100 MG tablet TAKE 1 TABLET DAILY 90 tablet 3 amLODIPine (NORVASC) 5 MG tablet TAKE 1 TABLET DAILY 90 tablet 3       Allergies   Allergen Reactions    Adhesive Tape      Pt had a burn around edges of bandage used with surgery in sept 2011 (compression type bandage    Sulfa Antibiotics Hives    Bactrim [Sulfamethoxazole-Trimethoprim] Rash    Lisinopril      cough       Patient Active Problem List   Diagnosis    Hypothyroidism    Rosacea    Hypertension        Past Medical History:   Diagnosis Date    Hypertension     Hypothyroidism        Past Surgical History:   Procedure Laterality Date    APPENDECTOMY  2010    TOTAL ABDOMINAL HYSTERECTOMY W/ BILATERAL SALPINGOOPHORECTOMY  2011    fibroid    URETER REVISION  9/2011    Dr. Patricia Vargas       Social History     Tobacco Use    Smoking status: Never    Smokeless tobacco: Never   Vaping Use    Vaping Use: Never used   Substance Use Topics    Alcohol use: Yes     Alcohol/week: 1.7 standard drinks     Types: 2 Standard drinks or equivalent per week    Drug use: No       Family History   Problem Relation Age of Onset    Hypertension Mother     Thyroid Cancer Mother 76        anaplastic    Rashes/Skin Problems Mother     Heart Disease Father         valve    Rashes/Skin Problems Other        Review of Systems  Review of Systems   Constitutional:  Negative for activity change, appetite change, fatigue and unexpected weight change. HENT:  Negative for congestion, hearing loss, nosebleeds, sore throat, tinnitus, trouble swallowing and voice change. Eyes:  Negative for visual disturbance. Respiratory:  Negative for choking, chest tightness, shortness of breath and wheezing. Cardiovascular:  Negative for chest pain, palpitations and leg swelling. Gastrointestinal:  Negative for abdominal pain, anal bleeding, blood in stool, constipation, diarrhea and nausea. Genitourinary:  Negative for dysuria, flank pain, frequency, hematuria, pelvic pain, vaginal bleeding and vaginal discharge.    Musculoskeletal:  Negative for arthralgias, back pain and myalgias. Skin:  Negative for color change and rash. Neurological:  Negative for light-headedness and headaches. Hematological:  Does not bruise/bleed easily. Psychiatric/Behavioral:  Negative for dysphoric mood and sleep disturbance. The patient is not nervous/anxious. Objective:   Physical Exam  .  Vitals:    07/20/22 0933 07/20/22 1031   BP: 130/80 128/76   Pulse: 68    Resp: 14    Temp: 97.7 °F (36.5 °C)    TempSrc: Temporal    SpO2: 100%    Weight: 192 lb (87.1 kg)    Height: 5' 8\" (1.727 m)       Wt Readings from Last 3 Encounters:   07/20/22 192 lb (87.1 kg)   06/18/21 190 lb (86.2 kg)   06/12/19 189 lb 8 oz (86 kg)        Physical Exam  Constitutional:       Appearance: Normal appearance. She is well-developed. HENT:      Head: Normocephalic and atraumatic. Right Ear: Hearing, tympanic membrane, ear canal and external ear normal.      Left Ear: Hearing, tympanic membrane, ear canal and external ear normal.      Nose: Nose normal.      Mouth/Throat:      Pharynx: Uvula midline. Eyes:      General: Lids are normal.      Conjunctiva/sclera: Conjunctivae normal.      Pupils: Pupils are equal, round, and reactive to light. Funduscopic exam:     Right eye: No hemorrhage, AV nicking or arteriolar narrowing. Left eye: No hemorrhage, AV nicking or arteriolar narrowing. Neck:      Thyroid: No thyroid mass or thyromegaly. Vascular: No carotid bruit or JVD. Trachea: Trachea normal.   Cardiovascular:      Rate and Rhythm: Normal rate and regular rhythm. Pulses:           Carotid pulses are 2+ on the right side and 2+ on the left side. Femoral pulses are 2+ on the right side and 2+ on the left side. Dorsalis pedis pulses are 2+ on the right side and 2+ on the left side. Posterior tibial pulses are 2+ on the right side and 2+ on the left side. Heart sounds: Normal heart sounds. No murmur heard.   Pulmonary: Effort: Pulmonary effort is normal. No respiratory distress. Breath sounds: Normal breath sounds. Chest:   Breasts:     Breasts are symmetrical.      Right: No inverted nipple, mass, nipple discharge, skin change, tenderness or supraclavicular adenopathy. Left: No inverted nipple, mass, nipple discharge, skin change, tenderness or supraclavicular adenopathy. Abdominal:      General: Bowel sounds are normal.      Tenderness: There is no abdominal tenderness. Hernia: No hernia is present. Musculoskeletal:         General: Normal range of motion. Cervical back: Neck supple. Lymphadenopathy:      Head:      Right side of head: No submental or submandibular adenopathy. Left side of head: No submental or submandibular adenopathy. Cervical: No cervical adenopathy. Upper Body:      Right upper body: No supraclavicular adenopathy. Left upper body: No supraclavicular adenopathy. Skin:     General: Skin is warm and dry. Findings: No bruising, lesion or rash. Neurological:      Mental Status: She is alert. Deep Tendon Reflexes: Reflexes are normal and symmetric. Reflex Scores:       Patellar reflexes are 2+ on the right side and 2+ on the left side. Psychiatric:         Speech: Speech normal.         Behavior: Behavior normal.         Thought Content: Thought content normal.         Judgment: Judgment normal.         Assessment and Plan       Diagnosis Orders   1. Well adult exam  Comprehensive Metabolic Panel    CBC    Lipid Panel  Discussed diet, exercise   Calcium and Vitamin D addressed  PAP Not indicated; discussed and she is comfortable with this  Annual to biannual mammogram  Annual influenza vaccine  Dt every 10 years  Colonoscopy every 10 years until age 78          2. Essential hypertension  amLODIPine (NORVASC) 5 MG tablet    losartan (COZAAR) 100 MG tablet  AT GOAL < 130/80      3.  Acquired hypothyroidism  TSH with Reflex          Side effects of current medications reviewed and questions answered. Follow up in 1 year or prn.

## 2022-07-20 ENCOUNTER — OFFICE VISIT (OUTPATIENT)
Dept: FAMILY MEDICINE CLINIC | Age: 62
End: 2022-07-20
Payer: COMMERCIAL

## 2022-07-20 VITALS
TEMPERATURE: 97.7 F | SYSTOLIC BLOOD PRESSURE: 128 MMHG | RESPIRATION RATE: 14 BRPM | WEIGHT: 192 LBS | BODY MASS INDEX: 29.1 KG/M2 | OXYGEN SATURATION: 100 % | DIASTOLIC BLOOD PRESSURE: 76 MMHG | HEART RATE: 68 BPM | HEIGHT: 68 IN

## 2022-07-20 DIAGNOSIS — I10 ESSENTIAL HYPERTENSION: ICD-10-CM

## 2022-07-20 DIAGNOSIS — Z00.00 WELL ADULT EXAM: ICD-10-CM

## 2022-07-20 DIAGNOSIS — Z00.00 WELL ADULT EXAM: Primary | ICD-10-CM

## 2022-07-20 DIAGNOSIS — R74.01 ELEVATED ALT MEASUREMENT: Primary | ICD-10-CM

## 2022-07-20 DIAGNOSIS — E03.9 ACQUIRED HYPOTHYROIDISM: ICD-10-CM

## 2022-07-20 LAB
A/G RATIO: 1.8 (ref 1.1–2.2)
ALBUMIN SERPL-MCNC: 4.8 G/DL (ref 3.4–5)
ALP BLD-CCNC: 54 U/L (ref 40–129)
ALT SERPL-CCNC: 41 U/L (ref 10–40)
ANION GAP SERPL CALCULATED.3IONS-SCNC: 15 MMOL/L (ref 3–16)
AST SERPL-CCNC: 29 U/L (ref 15–37)
BILIRUB SERPL-MCNC: 0.8 MG/DL (ref 0–1)
BUN BLDV-MCNC: 17 MG/DL (ref 7–20)
CALCIUM SERPL-MCNC: 9.9 MG/DL (ref 8.3–10.6)
CHLORIDE BLD-SCNC: 102 MMOL/L (ref 99–110)
CHOLESTEROL, TOTAL: 220 MG/DL (ref 0–199)
CO2: 22 MMOL/L (ref 21–32)
CREAT SERPL-MCNC: 0.8 MG/DL (ref 0.6–1.1)
GFR AFRICAN AMERICAN: >60
GFR NON-AFRICAN AMERICAN: >60
GLUCOSE BLD-MCNC: 107 MG/DL (ref 70–99)
HCT VFR BLD CALC: 43.9 % (ref 36–48)
HDLC SERPL-MCNC: 76 MG/DL (ref 40–60)
HEMOGLOBIN: 14.8 G/DL (ref 12–16)
LDL CHOLESTEROL CALCULATED: 128 MG/DL
MCH RBC QN AUTO: 31.1 PG (ref 26–34)
MCHC RBC AUTO-ENTMCNC: 33.7 G/DL (ref 31–36)
MCV RBC AUTO: 92.3 FL (ref 80–100)
PDW BLD-RTO: 12.6 % (ref 12.4–15.4)
PLATELET # BLD: 234 K/UL (ref 135–450)
PMV BLD AUTO: 8.9 FL (ref 5–10.5)
POTASSIUM SERPL-SCNC: 4 MMOL/L (ref 3.5–5.1)
RBC # BLD: 4.75 M/UL (ref 4–5.2)
SODIUM BLD-SCNC: 139 MMOL/L (ref 136–145)
TOTAL PROTEIN: 7.5 G/DL (ref 6.4–8.2)
TRIGL SERPL-MCNC: 78 MG/DL (ref 0–150)
TSH REFLEX: 2.69 UIU/ML (ref 0.27–4.2)
VLDLC SERPL CALC-MCNC: 16 MG/DL
WBC # BLD: 5.2 K/UL (ref 4–11)

## 2022-07-20 PROCEDURE — 99396 PREV VISIT EST AGE 40-64: CPT | Performed by: FAMILY MEDICINE

## 2022-07-20 RX ORDER — LOSARTAN POTASSIUM 100 MG/1
TABLET ORAL
Qty: 90 TABLET | Refills: 3 | Status: SHIPPED | OUTPATIENT
Start: 2022-07-20

## 2022-07-20 RX ORDER — AMLODIPINE BESYLATE 5 MG/1
TABLET ORAL
Qty: 90 TABLET | Refills: 3 | Status: SHIPPED | OUTPATIENT
Start: 2022-07-20

## 2022-07-20 SDOH — ECONOMIC STABILITY: FOOD INSECURITY: WITHIN THE PAST 12 MONTHS, THE FOOD YOU BOUGHT JUST DIDN'T LAST AND YOU DIDN'T HAVE MONEY TO GET MORE.: NEVER TRUE

## 2022-07-20 SDOH — ECONOMIC STABILITY: FOOD INSECURITY: WITHIN THE PAST 12 MONTHS, YOU WORRIED THAT YOUR FOOD WOULD RUN OUT BEFORE YOU GOT MONEY TO BUY MORE.: NEVER TRUE

## 2022-07-20 ASSESSMENT — PATIENT HEALTH QUESTIONNAIRE - PHQ9
SUM OF ALL RESPONSES TO PHQ QUESTIONS 1-9: 0
2. FEELING DOWN, DEPRESSED OR HOPELESS: 0
SUM OF ALL RESPONSES TO PHQ QUESTIONS 1-9: 0
SUM OF ALL RESPONSES TO PHQ9 QUESTIONS 1 & 2: 0
1. LITTLE INTEREST OR PLEASURE IN DOING THINGS: 0

## 2022-07-20 ASSESSMENT — ENCOUNTER SYMPTOMS
CONSTIPATION: 0
ANAL BLEEDING: 0
BLOOD IN STOOL: 0
BACK PAIN: 0
CHEST TIGHTNESS: 0
COLOR CHANGE: 0
NAUSEA: 0
DIARRHEA: 0
TROUBLE SWALLOWING: 0
ABDOMINAL PAIN: 0
WHEEZING: 0
SHORTNESS OF BREATH: 0
SORE THROAT: 0
VOICE CHANGE: 0
CHOKING: 0

## 2022-07-20 ASSESSMENT — SOCIAL DETERMINANTS OF HEALTH (SDOH): HOW HARD IS IT FOR YOU TO PAY FOR THE VERY BASICS LIKE FOOD, HOUSING, MEDICAL CARE, AND HEATING?: NOT HARD AT ALL

## 2022-07-21 DIAGNOSIS — E03.9 ACQUIRED HYPOTHYROIDISM: ICD-10-CM

## 2022-07-21 RX ORDER — LEVOTHYROXINE SODIUM 75 MCG
TABLET ORAL
Qty: 90 TABLET | Refills: 3 | Status: SHIPPED | OUTPATIENT
Start: 2022-07-21 | End: 2022-07-21 | Stop reason: SDUPTHER

## 2022-07-21 RX ORDER — LEVOTHYROXINE SODIUM 75 MCG
TABLET ORAL
Qty: 90 TABLET | Refills: 3 | Status: SHIPPED | OUTPATIENT
Start: 2022-07-21

## 2022-07-21 NOTE — TELEPHONE ENCOUNTER
07/20/2022 LOV  No FOV Scheduled - LM for patient to call and Schedule her Follow Up Appointment. Labs: 07/20/2022 - Completed    Medication Pending to be signed for 90 days.

## 2023-01-26 DIAGNOSIS — R74.01 ELEVATED ALT MEASUREMENT: ICD-10-CM

## 2023-01-26 LAB
ALBUMIN SERPL-MCNC: 4.4 G/DL (ref 3.4–5)
ALP BLD-CCNC: 55 U/L (ref 40–129)
ALT SERPL-CCNC: 32 U/L (ref 10–40)
AST SERPL-CCNC: 22 U/L (ref 15–37)
BILIRUB SERPL-MCNC: 0.5 MG/DL (ref 0–1)
BILIRUBIN DIRECT: <0.2 MG/DL (ref 0–0.3)
BILIRUBIN, INDIRECT: NORMAL MG/DL (ref 0–1)
TOTAL PROTEIN: 7.2 G/DL (ref 6.4–8.2)

## 2023-02-16 ENCOUNTER — TELEPHONE (OUTPATIENT)
Dept: FAMILY MEDICINE CLINIC | Age: 63
End: 2023-02-16

## 2023-02-16 NOTE — TELEPHONE ENCOUNTER
----- Message from Mervat Ellis sent at 2/16/2023  4:00 PM EST -----  Subject: Message to Provider    QUESTIONS  Information for Provider? Patient is requesting if Dr. Noel Guadarrama nurse could   give her a call back to clarify if Dr. Noel Guadarrama can preform a physical the   same day as her new patient appointment on 8/30/2023. Please advise.   ---------------------------------------------------------------------------  --------------  Laly MIR  [phone]; OK to leave message on voicemail  ---------------------------------------------------------------------------  --------------  SCRIPT ANSWERS  Relationship to Patient?  Self

## 2023-02-17 NOTE — TELEPHONE ENCOUNTER
Spoke with patient, let her know that yes a physical would be possible. If there would be multiple issues to discuss though, a physical would be most likely put off so that her issues can start to be addressed.

## 2023-03-17 ENCOUNTER — PATIENT MESSAGE (OUTPATIENT)
Dept: FAMILY MEDICINE CLINIC | Age: 63
End: 2023-03-17

## 2023-03-17 RX ORDER — CEPHALEXIN 500 MG/1
500 CAPSULE ORAL 3 TIMES DAILY
Qty: 15 CAPSULE | Refills: 0 | Status: SHIPPED | OUTPATIENT
Start: 2023-03-17 | End: 2023-03-22

## 2023-03-17 NOTE — TELEPHONE ENCOUNTER
From: Carmen Medina  To: Dr. Debi Smith: 3/17/2023 9:51 AM EDT  Subject: UTI? - Need help today    Hello,  I think I may have a UTI. I am feeling pressure and the need to urinate often. I am currently in [place], [place] and will be flying to the [email or web address] tomorrow morning, so I'm hoping to get some treatment before then. There is a [facility] near me:  [address] [place] [address], [address] [place] [address]  [phone]  Fax [phone]    Would it be possible to have a urine test sent there today? Also, I had a UTI in 2020 and you prescribed Macrobid, and then Keflex. Would it be possible to prescribe these 'just in case'? I am going on a sailing trip and will not have easy access to medical facilities or a drug store. The nearest [place] is:  [address] [place] [address], [address] [place] [address]  [phone]    Thank you so much. I will follow up with a phone call to you.

## 2023-04-04 ENCOUNTER — OFFICE VISIT (OUTPATIENT)
Dept: PRIMARY CARE CLINIC | Age: 63
End: 2023-04-04
Payer: COMMERCIAL

## 2023-04-04 VITALS
TEMPERATURE: 97.6 F | HEIGHT: 67 IN | SYSTOLIC BLOOD PRESSURE: 132 MMHG | BODY MASS INDEX: 30.48 KG/M2 | HEART RATE: 75 BPM | DIASTOLIC BLOOD PRESSURE: 76 MMHG | OXYGEN SATURATION: 99 % | WEIGHT: 194.2 LBS

## 2023-04-04 DIAGNOSIS — H69.93 DISORDER OF BOTH EUSTACHIAN TUBES: ICD-10-CM

## 2023-04-04 DIAGNOSIS — Z01.818 PREOPERATIVE EXAMINATION: ICD-10-CM

## 2023-04-04 DIAGNOSIS — H02.403 PTOSIS OF EYELID, BILATERAL: Primary | ICD-10-CM

## 2023-04-04 DIAGNOSIS — I10 PRIMARY HYPERTENSION: ICD-10-CM

## 2023-04-04 DIAGNOSIS — E03.9 ACQUIRED HYPOTHYROIDISM: ICD-10-CM

## 2023-04-04 PROCEDURE — 3078F DIAST BP <80 MM HG: CPT | Performed by: FAMILY MEDICINE

## 2023-04-04 PROCEDURE — 3075F SYST BP GE 130 - 139MM HG: CPT | Performed by: FAMILY MEDICINE

## 2023-04-04 PROCEDURE — 99214 OFFICE O/P EST MOD 30 MIN: CPT | Performed by: FAMILY MEDICINE

## 2023-04-04 SDOH — HEALTH STABILITY: PHYSICAL HEALTH: ON AVERAGE, HOW MANY MINUTES DO YOU ENGAGE IN EXERCISE AT THIS LEVEL?: 60 MIN

## 2023-04-04 SDOH — ECONOMIC STABILITY: HOUSING INSECURITY
IN THE LAST 12 MONTHS, WAS THERE A TIME WHEN YOU DID NOT HAVE A STEADY PLACE TO SLEEP OR SLEPT IN A SHELTER (INCLUDING NOW)?: NO

## 2023-04-04 SDOH — ECONOMIC STABILITY: INCOME INSECURITY: HOW HARD IS IT FOR YOU TO PAY FOR THE VERY BASICS LIKE FOOD, HOUSING, MEDICAL CARE, AND HEATING?: NOT HARD AT ALL

## 2023-04-04 SDOH — ECONOMIC STABILITY: FOOD INSECURITY: WITHIN THE PAST 12 MONTHS, YOU WORRIED THAT YOUR FOOD WOULD RUN OUT BEFORE YOU GOT MONEY TO BUY MORE.: NEVER TRUE

## 2023-04-04 SDOH — HEALTH STABILITY: PHYSICAL HEALTH: ON AVERAGE, HOW MANY DAYS PER WEEK DO YOU ENGAGE IN MODERATE TO STRENUOUS EXERCISE (LIKE A BRISK WALK)?: 3 DAYS

## 2023-04-04 SDOH — ECONOMIC STABILITY: FOOD INSECURITY: WITHIN THE PAST 12 MONTHS, THE FOOD YOU BOUGHT JUST DIDN'T LAST AND YOU DIDN'T HAVE MONEY TO GET MORE.: NEVER TRUE

## 2023-04-04 ASSESSMENT — ENCOUNTER SYMPTOMS
CONSTIPATION: 0
EYE ITCHING: 0
SHORTNESS OF BREATH: 0
VOMITING: 0
COUGH: 0
SORE THROAT: 0
EYE PAIN: 0
ABDOMINAL PAIN: 0
WHEEZING: 0
DIARRHEA: 0
NAUSEA: 0

## 2023-04-04 ASSESSMENT — PATIENT HEALTH QUESTIONNAIRE - PHQ9
SUM OF ALL RESPONSES TO PHQ QUESTIONS 1-9: 0
2. FEELING DOWN, DEPRESSED OR HOPELESS: 0
SUM OF ALL RESPONSES TO PHQ QUESTIONS 1-9: 0
1. LITTLE INTEREST OR PLEASURE IN DOING THINGS: 0
SUM OF ALL RESPONSES TO PHQ9 QUESTIONS 1 & 2: 0

## 2023-04-04 NOTE — PATIENT INSTRUCTIONS
[facility] Laboratory Locations - No appointment necessary. @ indicates the location is open Saturdays in addition to Monday through Friday. Call your preferred location for test preparation, business hours and other information you need. [employer] accepts [employer]. [facility]     @ [address] [facility] [address] [facility]. [place], [facility] [address]    Ph: [address]   [address] [facility], [address]    Ph: [phone]   @ [address] [place] [address].[facility]    Ph: [address]    Ph: [phone]  @ [address] [place] [address]   Ph: [phone]  @ [address] [place] [address]. [facility] [address]    Ph: [address] [place], [place] [address]yl [address]   Ph: [phone]    [place]    @ [facility]. [facility] [address]    Ph: [phone]  [facility]   [address] [facility], [address]   Ph: [address]: [address] [facility] Omar Mc    Ph: [phone]   [facility]   [address] [facility]. Omar Marvin   Ph: [address] [facility]  [address] [facility] [address]    Ph: [phone]

## 2023-04-04 NOTE — PROGRESS NOTES
Shank        Allergies   Allergen Reactions    Bactrim [Sulfamethoxazole-Trimethoprim] Hives and Rash    Lisinopril Cough        Family History   Problem Relation Age of Onset    Hypertension Mother     Thyroid Cancer Mother 76        anaplastic        Patient's past medical history, surgical history, family history, medications, and allergies  were all reviewed and updated as appropriate today. Review of Systems   Constitutional:  Negative for fatigue, fever and unexpected weight change. HENT:  Positive for ear pain and hearing loss. Negative for congestion, ear discharge and sore throat. Eyes:  Positive for visual disturbance (2/2 ptosis). Negative for pain and itching. Respiratory:  Negative for cough, shortness of breath and wheezing. Cardiovascular:  Negative for chest pain, palpitations and leg swelling. Gastrointestinal:  Negative for abdominal pain, constipation, diarrhea, nausea and vomiting. Endocrine: Negative for cold intolerance, heat intolerance, polydipsia and polyuria. Genitourinary:  Negative for dysuria, frequency and hematuria. Musculoskeletal:  Negative for arthralgias and joint swelling. Skin:  Negative for rash. Neurological:  Negative for dizziness and headaches. /76 (Cuff Size: Medium Adult)   Pulse 75   Temp 97.6 °F (36.4 °C) (Oral)   Ht 5' 6.5\" (1.689 m)   Wt 194 lb 3.2 oz (88.1 kg)   SpO2 99% Comment: room air  BMI 30.88 kg/m²      Physical Exam  Vitals reviewed. Constitutional:       General: She is not in acute distress. Appearance: Normal appearance. She is well-developed and normal weight. HENT:      Head: Normocephalic and atraumatic. Right Ear: Ear canal normal. No drainage. A middle ear effusion (clear) is present. Tympanic membrane is not injected, erythematous or bulging. Left Ear: Ear canal normal. No drainage. A middle ear effusion (clear) is present. Tympanic membrane is not injected, erythematous or bulging.       Nose:

## 2023-06-08 ENCOUNTER — OFFICE VISIT (OUTPATIENT)
Dept: DERMATOLOGY | Age: 63
End: 2023-06-08
Payer: COMMERCIAL

## 2023-06-08 DIAGNOSIS — L57.0 AK (ACTINIC KERATOSIS): ICD-10-CM

## 2023-06-08 DIAGNOSIS — L82.1 SEBORRHEIC KERATOSIS: ICD-10-CM

## 2023-06-08 DIAGNOSIS — L72.0 EPIDERMOID CYST: ICD-10-CM

## 2023-06-08 DIAGNOSIS — L70.0 ACNE VULGARIS: ICD-10-CM

## 2023-06-08 DIAGNOSIS — Z80.8 FAMILY HISTORY OF MALIGNANT MELANOMA: ICD-10-CM

## 2023-06-08 DIAGNOSIS — D23.9 DERMATOFIBROMA: ICD-10-CM

## 2023-06-08 DIAGNOSIS — D48.5 NEOPLASM OF UNCERTAIN BEHAVIOR OF SKIN: ICD-10-CM

## 2023-06-08 DIAGNOSIS — D22.9 MULTIPLE NEVI: Primary | ICD-10-CM

## 2023-06-08 PROCEDURE — 99214 OFFICE O/P EST MOD 30 MIN: CPT | Performed by: DERMATOLOGY

## 2023-07-03 ENCOUNTER — TELEPHONE (OUTPATIENT)
Dept: DERMATOLOGY | Age: 63
End: 2023-07-03

## 2023-07-03 NOTE — TELEPHONE ENCOUNTER
Patient notified that ok to see Dr. Erica Sim and that the  from that department should be calling to schedule.

## 2023-07-08 DIAGNOSIS — I10 ESSENTIAL HYPERTENSION: ICD-10-CM

## 2023-07-10 RX ORDER — LOSARTAN POTASSIUM 100 MG/1
TABLET ORAL
Qty: 90 TABLET | Refills: 0 | Status: SHIPPED | OUTPATIENT
Start: 2023-07-10 | End: 2023-08-30 | Stop reason: SDUPTHER

## 2023-07-14 ENCOUNTER — TELEPHONE (OUTPATIENT)
Dept: SURGERY | Age: 63
End: 2023-07-14

## 2023-07-14 NOTE — TELEPHONE ENCOUNTER
Pt called me back to confirm if she can keep the 8/2 appt at 2:30 pm.  She wanted to move the appt out farther than this and I advised her to check with Dr. Martin Pollen office first before cx and rescheduling to a later date in August.

## 2023-07-18 ENCOUNTER — TELEPHONE (OUTPATIENT)
Dept: SURGERY | Age: 63
End: 2023-07-18

## 2023-07-18 NOTE — TELEPHONE ENCOUNTER
The patient called back and requested to move her nail bx appt (recommended by EF on 8/2) to 8/14 at 2:30 pm if the date was still available. She also has a message to Dr. Jacqueline Mena to see if she's ok to move her appt out later and is waiting to hear from their office. The patient wanted to know more about what to expect the day of the biopsy. Please call pt to explain what will be taking place for the nail biopsy. Pt is aware that Dr. Sanya Shea is out until 7/24 and will be expecting to hear from her or staff that week regarding her concern. Including Dr. Fadumo Wilson staff on this message to know that the pt went ahead and rescheduled her appt out to 8/14 instead of keeping 8/2 appt.

## 2023-07-31 ENCOUNTER — OFFICE VISIT (OUTPATIENT)
Dept: PRIMARY CARE CLINIC | Age: 63
End: 2023-07-31
Payer: COMMERCIAL

## 2023-07-31 VITALS
SYSTOLIC BLOOD PRESSURE: 132 MMHG | HEART RATE: 79 BPM | TEMPERATURE: 98.4 F | OXYGEN SATURATION: 100 % | BODY MASS INDEX: 30.81 KG/M2 | WEIGHT: 193.8 LBS | DIASTOLIC BLOOD PRESSURE: 70 MMHG

## 2023-07-31 DIAGNOSIS — H02.402 PTOSIS OF LEFT EYELID: Primary | ICD-10-CM

## 2023-07-31 DIAGNOSIS — Z01.818 PRE-OP EXAM: ICD-10-CM

## 2023-07-31 DIAGNOSIS — I10 PRIMARY HYPERTENSION: ICD-10-CM

## 2023-07-31 PROCEDURE — 99213 OFFICE O/P EST LOW 20 MIN: CPT | Performed by: FAMILY MEDICINE

## 2023-07-31 PROCEDURE — 3075F SYST BP GE 130 - 139MM HG: CPT | Performed by: FAMILY MEDICINE

## 2023-07-31 PROCEDURE — 3078F DIAST BP <80 MM HG: CPT | Performed by: FAMILY MEDICINE

## 2023-07-31 ASSESSMENT — ENCOUNTER SYMPTOMS
NAUSEA: 0
DIARRHEA: 0
SHORTNESS OF BREATH: 0
VOMITING: 0
ABDOMINAL PAIN: 0

## 2023-07-31 NOTE — PROGRESS NOTES
[address] PRIMARY CARE  [address]  [facility] [address]  Dept: [phone]  Dept Fax: [phone]     7/31/2023      Rosangela Lira   1960     Chief Complaint   Patient presents with    Pre-op Exam     8/23/23 CEI        HPI    Pt comes in today for pre-op exam.     Just had pre-op exam on 4/4/23 for Ptosis correction BL. Left side requires additional surgery. No complications with anesthesia. Does note BP was high at last surgery. + associated anxiety with the procedure. Otherwise feeling well and in her usual state of health. No data recorded     Prior to Visit Medications    Medication Sig Taking? Authorizing Provider   losartan (COZAAR) 100 MG tablet TAKE 1 TABLET DAILY Yes Galileo García DO   SYNTHROID 75 MCG tablet TAKE 1 TABLET DAILY Yes Isaac Multani MD   amLODIPine (NORVASC) 5 MG tablet TAKE 1 TABLET DAILY Yes Isaac Multani MD        Past Medical History:   Diagnosis Date    Hypertension     Hypothyroidism         Social History     Tobacco Use    Smoking status: Never    Smokeless tobacco: Never   Vaping Use    Vaping Use: Never used   Substance Use Topics    Alcohol use:  Yes     Alcohol/week: 4.0 standard drinks     Types: 4 Glasses of wine per week    Drug use: No        Past Surgical History:   Procedure Laterality Date    APPENDECTOMY  01/01/2010    EYE SURGERY Bilateral     Ptosis correction    YARIEL AND BSO (CERVIX REMOVED)  01/01/2011    fibroid    URETER REVISION  09/01/2011    Dr. Dilcia Patel        Allergies   Allergen Reactions    Bactrim [Sulfamethoxazole-Trimethoprim] Hives and Rash    Lisinopril Cough        Family History   Problem Relation Age of Onset    Hypertension Mother     Thyroid Cancer Mother 76        anaplastic    Heart Disease Father         valve        Patient's past medical history, surgical history, family history, medications, and allergies  were all reviewed and updated as appropriate

## 2023-08-14 ENCOUNTER — INITIAL CONSULT (OUTPATIENT)
Dept: SURGERY | Age: 63
End: 2023-08-14
Payer: COMMERCIAL

## 2023-08-14 VITALS — DIASTOLIC BLOOD PRESSURE: 85 MMHG | HEART RATE: 68 BPM | SYSTOLIC BLOOD PRESSURE: 145 MMHG

## 2023-08-14 DIAGNOSIS — L60.9 NAIL ABNORMALITY: Primary | ICD-10-CM

## 2023-08-14 PROCEDURE — 3074F SYST BP LT 130 MM HG: CPT | Performed by: DERMATOLOGY

## 2023-08-14 PROCEDURE — 99213 OFFICE O/P EST LOW 20 MIN: CPT | Performed by: DERMATOLOGY

## 2023-08-14 PROCEDURE — 3078F DIAST BP <80 MM HG: CPT | Performed by: DERMATOLOGY

## 2023-08-15 NOTE — PROGRESS NOTES
PRE-PROCEDURE SCREENING    Pacemaker/ICD: No  Difficulty with numbing in the past: No  Local Anesthesia Reaction/passing out: No  Latex or adhesive allergy:  No  Bleeding/Clotting Disorders: No  Anticoagulant Therapy: No  Joint prosthesis: No  Artificial Heart Valve: No  Stroke or Seizures: No  Organ Transplant or Lymphoma: No  Immunosuppression: No  Respiratory Problems: No
she has eye surgery next week. While the clinical suspicion is low for SCC and even lower for MM, I told the pt both of these malignancies are within the differential and given the low risk nature of a biopsy, would recommend moving forward with diagnostic testing. The patient was informed of this recommendation, risks and benefits, alternatives to treatment, recovery and pre and post operative care and opted to defer scheduling punch biopsy at t his time. The patient was given information on the procedure as well as pre-operative and general post-operative care. No contraindications to surgery.

## 2023-08-30 ENCOUNTER — OFFICE VISIT (OUTPATIENT)
Dept: PRIMARY CARE CLINIC | Age: 63
End: 2023-08-30
Payer: COMMERCIAL

## 2023-08-30 VITALS
WEIGHT: 190.4 LBS | BODY MASS INDEX: 30.27 KG/M2 | DIASTOLIC BLOOD PRESSURE: 80 MMHG | SYSTOLIC BLOOD PRESSURE: 124 MMHG | OXYGEN SATURATION: 100 % | HEART RATE: 65 BPM | TEMPERATURE: 98 F

## 2023-08-30 DIAGNOSIS — N95.1 POST MENOPAUSAL SYNDROME: ICD-10-CM

## 2023-08-30 DIAGNOSIS — I10 ESSENTIAL HYPERTENSION: ICD-10-CM

## 2023-08-30 DIAGNOSIS — Z00.00 ROUTINE PHYSICAL EXAMINATION: Primary | ICD-10-CM

## 2023-08-30 DIAGNOSIS — Z00.00 ROUTINE PHYSICAL EXAMINATION: ICD-10-CM

## 2023-08-30 DIAGNOSIS — R74.8 ELEVATED LIVER ENZYMES: ICD-10-CM

## 2023-08-30 DIAGNOSIS — E03.9 ACQUIRED HYPOTHYROIDISM: ICD-10-CM

## 2023-08-30 LAB
25(OH)D3 SERPL-MCNC: 36.7 NG/ML
ALBUMIN SERPL-MCNC: 4.7 G/DL (ref 3.4–5)
ALP SERPL-CCNC: 54 U/L (ref 40–129)
ALT SERPL-CCNC: 43 U/L (ref 10–40)
ANION GAP SERPL CALCULATED.3IONS-SCNC: 8 MMOL/L (ref 3–16)
AST SERPL-CCNC: 26 U/L (ref 15–37)
BASOPHILS # BLD: 0 K/UL (ref 0–0.2)
BASOPHILS NFR BLD: 0.7 %
BILIRUB DIRECT SERPL-MCNC: <0.2 MG/DL (ref 0–0.3)
BILIRUB INDIRECT SERPL-MCNC: ABNORMAL MG/DL (ref 0–1)
BILIRUB SERPL-MCNC: 0.8 MG/DL (ref 0–1)
BUN SERPL-MCNC: 13 MG/DL (ref 7–20)
CALCIUM SERPL-MCNC: 9.9 MG/DL (ref 8.3–10.6)
CHLORIDE SERPL-SCNC: 104 MMOL/L (ref 99–110)
CHOLEST SERPL-MCNC: 219 MG/DL (ref 0–199)
CO2 SERPL-SCNC: 27 MMOL/L (ref 21–32)
CREAT SERPL-MCNC: 0.8 MG/DL (ref 0.6–1.2)
DEPRECATED RDW RBC AUTO: 12.3 % (ref 12.4–15.4)
EOSINOPHIL # BLD: 0.2 K/UL (ref 0–0.6)
EOSINOPHIL NFR BLD: 2.8 %
GFR SERPLBLD CREATININE-BSD FMLA CKD-EPI: >60 ML/MIN/{1.73_M2}
GLUCOSE SERPL-MCNC: 107 MG/DL (ref 70–99)
HCT VFR BLD AUTO: 47.9 % (ref 36–48)
HDLC SERPL-MCNC: 63 MG/DL (ref 40–60)
HGB BLD-MCNC: 15.8 G/DL (ref 12–16)
LDL CHOLESTEROL CALCULATED: 135 MG/DL
LYMPHOCYTES # BLD: 2.2 K/UL (ref 1–5.1)
LYMPHOCYTES NFR BLD: 39.5 %
MCH RBC QN AUTO: 31.4 PG (ref 26–34)
MCHC RBC AUTO-ENTMCNC: 33.1 G/DL (ref 31–36)
MCV RBC AUTO: 95 FL (ref 80–100)
MONOCYTES # BLD: 0.6 K/UL (ref 0–1.3)
MONOCYTES NFR BLD: 10.9 %
NEUTROPHILS # BLD: 2.6 K/UL (ref 1.7–7.7)
NEUTROPHILS NFR BLD: 46.1 %
PLATELET # BLD AUTO: 262 K/UL (ref 135–450)
PMV BLD AUTO: 9.5 FL (ref 5–10.5)
POTASSIUM SERPL-SCNC: 3.9 MMOL/L (ref 3.5–5.1)
PROT SERPL-MCNC: 7.2 G/DL (ref 6.4–8.2)
RBC # BLD AUTO: 5.04 M/UL (ref 4–5.2)
SODIUM SERPL-SCNC: 139 MMOL/L (ref 136–145)
TRIGL SERPL-MCNC: 105 MG/DL (ref 0–150)
TSH SERPL DL<=0.005 MIU/L-ACNC: 1.5 UIU/ML (ref 0.27–4.2)
VLDLC SERPL CALC-MCNC: 21 MG/DL
WBC # BLD AUTO: 5.6 K/UL (ref 4–11)

## 2023-08-30 PROCEDURE — 99396 PREV VISIT EST AGE 40-64: CPT | Performed by: FAMILY MEDICINE

## 2023-08-30 PROCEDURE — 3078F DIAST BP <80 MM HG: CPT | Performed by: FAMILY MEDICINE

## 2023-08-30 PROCEDURE — 3074F SYST BP LT 130 MM HG: CPT | Performed by: FAMILY MEDICINE

## 2023-08-30 RX ORDER — AMLODIPINE BESYLATE 5 MG/1
TABLET ORAL
Qty: 90 TABLET | Refills: 3 | Status: SHIPPED | OUTPATIENT
Start: 2023-08-30

## 2023-08-30 RX ORDER — LEVOTHYROXINE SODIUM 75 MCG
TABLET ORAL
Qty: 90 TABLET | Refills: 3 | Status: SHIPPED | OUTPATIENT
Start: 2023-08-30

## 2023-08-30 RX ORDER — LOSARTAN POTASSIUM 100 MG/1
TABLET ORAL
Qty: 90 TABLET | Refills: 3 | Status: SHIPPED | OUTPATIENT
Start: 2023-08-30

## 2023-08-30 NOTE — PATIENT INSTRUCTIONS
[address] [facility] Laboratory Locations - No appointment necessary. ? indicates the location is open Saturdays in addition to Monday through Friday. Call your preferred location for test preparation, business hours and other information you need. [employer] accepts [employer]. Critical access hospital    ? [place] [id number] [facility]. [facility], [address]    Ph: [address] [facility], [address]    Ph: [phone]   ? [address]    [place], [address] [facility]    Ph: [address] [place], [address]    Ph: [phone] ? [place]   [address]   Ph: [phone]  ? [address] [facility], [address] [facility]    Ph: [place] [address] [facility], [address] [facility]   Ph: [phone]    NORTH    ? [facility] [place] [address]    Ph: [phone]  [facility]   [address] [facility], [facility]   Ph: [place]. [place], [address]    [address] [facility]: [address]    Ph: [facility].  [facility] [place], [place] [address]    Ph: [phone]
Patient/EMS

## 2023-08-31 LAB
EST. AVERAGE GLUCOSE BLD GHB EST-MCNC: 114 MG/DL
HBA1C MFR BLD: 5.6 %

## 2023-08-31 ASSESSMENT — ENCOUNTER SYMPTOMS
ABDOMINAL PAIN: 0
DIARRHEA: 0
VOMITING: 0
SHORTNESS OF BREATH: 0
NAUSEA: 0

## 2023-08-31 NOTE — PROGRESS NOTES
physical examination Yes    Acquired hypothyroidism     Essential hypertension     Elevated liver enzymes     Post menopausal syndrome        Plan:    - UTD on HM.   - Chronic conditions stable. - Fasting labs. - Dexa ordered. Will check with insurance to be sure covered.      New Prescriptions    No medications on file        Orders Placed This Encounter   Procedures    DEXA BONE DENSITY 2 SITES    CBC with Auto Differential    Lipid, Fasting    TSH    Hemoglobin K1S    Basic Metabolic Panel    Hepatic Function Panel    Vitamin D 25 Hydroxy        Return in about 1 year (around 8/30/2024) for Annual physical.         Brain Nice, DO

## 2023-08-31 NOTE — RESULT ENCOUNTER NOTE
Your labs are really stable overall. Your cholesterol and blood sugar were just slightly up.  Keep working on your diet and exercise and we will keep an eye on things yearly. - Dr. Leobardo Ryees

## 2023-10-02 ENCOUNTER — NURSE ONLY (OUTPATIENT)
Dept: PRIMARY CARE CLINIC | Age: 63
End: 2023-10-02
Payer: COMMERCIAL

## 2023-10-02 DIAGNOSIS — Z23 FLU VACCINE NEED: Primary | ICD-10-CM

## 2023-10-02 PROCEDURE — 90471 IMMUNIZATION ADMIN: CPT | Performed by: FAMILY MEDICINE

## 2023-10-02 PROCEDURE — 90674 CCIIV4 VAC NO PRSV 0.5 ML IM: CPT | Performed by: FAMILY MEDICINE

## 2023-10-03 ENCOUNTER — HOSPITAL ENCOUNTER (OUTPATIENT)
Dept: GENERAL RADIOLOGY | Age: 63
Discharge: HOME | End: 2023-10-03
Attending: FAMILY MEDICINE
Payer: COMMERCIAL

## 2023-10-03 PROCEDURE — 77080 DXA BONE DENSITY AXIAL: CPT

## 2023-12-14 ENCOUNTER — OFFICE VISIT (OUTPATIENT)
Dept: PRIMARY CARE CLINIC | Age: 63
End: 2023-12-14
Payer: COMMERCIAL

## 2023-12-14 VITALS
HEART RATE: 83 BPM | WEIGHT: 195.8 LBS | SYSTOLIC BLOOD PRESSURE: 123 MMHG | DIASTOLIC BLOOD PRESSURE: 78 MMHG | BODY MASS INDEX: 31.13 KG/M2 | TEMPERATURE: 97.7 F | OXYGEN SATURATION: 100 %

## 2023-12-14 DIAGNOSIS — H26.9 CATARACT OF BOTH EYES, UNSPECIFIED CATARACT TYPE: Primary | ICD-10-CM

## 2023-12-14 DIAGNOSIS — Z01.818 PRE-OP EXAM: ICD-10-CM

## 2023-12-14 PROCEDURE — 3074F SYST BP LT 130 MM HG: CPT | Performed by: FAMILY MEDICINE

## 2023-12-14 PROCEDURE — 99213 OFFICE O/P EST LOW 20 MIN: CPT | Performed by: FAMILY MEDICINE

## 2023-12-14 PROCEDURE — 3078F DIAST BP <80 MM HG: CPT | Performed by: FAMILY MEDICINE

## 2023-12-14 NOTE — PROGRESS NOTES
this encounter. Return if symptoms worsen or fail to improve.          [address], DO

## 2024-06-12 ENCOUNTER — OFFICE VISIT (OUTPATIENT)
Dept: DERMATOLOGY | Age: 64
End: 2024-06-12
Payer: COMMERCIAL

## 2024-06-12 DIAGNOSIS — D23.9 DERMATOFIBROMA: ICD-10-CM

## 2024-06-12 DIAGNOSIS — L72.0 EPIDERMOID CYST: ICD-10-CM

## 2024-06-12 DIAGNOSIS — L21.9 SEBORRHEIC DERMATITIS: ICD-10-CM

## 2024-06-12 DIAGNOSIS — L70.0 ACNE VULGARIS: ICD-10-CM

## 2024-06-12 DIAGNOSIS — D48.5 NEOPLASM OF UNCERTAIN BEHAVIOR OF SKIN: ICD-10-CM

## 2024-06-12 DIAGNOSIS — Z80.8 FAMILY HISTORY OF MALIGNANT MELANOMA: ICD-10-CM

## 2024-06-12 DIAGNOSIS — D22.9 MULTIPLE NEVI: Primary | ICD-10-CM

## 2024-06-12 DIAGNOSIS — L57.0 AK (ACTINIC KERATOSIS): ICD-10-CM

## 2024-06-12 PROCEDURE — 99214 OFFICE O/P EST MOD 30 MIN: CPT | Performed by: DERMATOLOGY

## 2024-06-12 NOTE — PROGRESS NOTES
[facility]  Danielle Swanson MD  [phone]      Nikki Portillo  1960    63 y.o. female     Date of Visit: 6/12/2024    Chief Complaint: moles/lesions  Chief Complaint   Patient presents with    Skin Exam     Last seen: 6-2023  *Oldest daughter [place] had a baby boy summer 2022    History of Present Illness:  1. Here for full skin check; has scattered asx moles on the trunk and extremities, many present since child/young adult; no change in s/s/c of any lesions; no bleeding lesions.  No personal hx of skin cancer.    Daughter with hx of atypical pigmented lesion/MM in situ as noted below.  Wears sunscreen regularly.    2. F/u for several year hx of erythema and scaling involving the brows.  It occurs intermittently and is worse in the winter.  She has repeatedly noted that she clears completely intermittently.  She has tried HC 2.5 oint and elidel in the past w/o significant improvement.  Previously the central part of her brows were flared.  She picks at the area.  Denies rough spots/scaling elsewhere on the face or body. Lateral part of brows flared minimally and she feels like they are much better and not bothersome.  Central area with some activity today.    3. F/u mild comedonal acne and milia.  She had used atralin for awhile after a previous visit but has not been using anything recently.  Has been getting dermaplaning, peels or hydrafacials and feels that this helps.  Had IPL for eyes bu face also treated in 2024.    4. F/u AK's-  No new concerns.    5. S/p bx of the L cheek  - read as ISK.  Treated with LN2 for residual lesion and this remains clear.    6.  She has a bump at the back that she would like checked.  Asymptomatic.    7.  Previously noted focal discolored area near the base of the R thumbnail.  Denies trauma.  Feels that this has been present for a few years.  Refer to Dr. Chatman last year for biopsy but Nikki decided not to proceed with biopsy after

## 2024-08-16 ENCOUNTER — PATIENT MESSAGE (OUTPATIENT)
Dept: DERMATOLOGY | Age: 64
End: 2024-08-16

## 2024-08-16 DIAGNOSIS — I10 ESSENTIAL HYPERTENSION: ICD-10-CM

## 2024-08-19 RX ORDER — LOSARTAN POTASSIUM 100 MG/1
TABLET ORAL
Qty: 90 TABLET | Refills: 0 | Status: SHIPPED | OUTPATIENT
Start: 2024-08-19

## 2024-08-19 NOTE — TELEPHONE ENCOUNTER
LAST SEEN       NEXT APPOINTMENT       LAST FILL  12/14/23 8/29/24 8/30/23       I just need enough so I don't runout before my appointment with Dr. Crooks on Aug. 29th.  Please send to [facility], [address], [place], [place].  [phone].  Thank you!

## 2024-08-26 RX ORDER — HYDROCORTISONE 25 MG/G
OINTMENT TOPICAL
Qty: 20 G | Refills: 0 | Status: SHIPPED | OUTPATIENT
Start: 2024-08-26 | End: 2024-09-02

## 2024-08-26 ASSESSMENT — PATIENT HEALTH QUESTIONNAIRE - PHQ9
SUM OF ALL RESPONSES TO PHQ QUESTIONS 1-9: 0
1. LITTLE INTEREST OR PLEASURE IN DOING THINGS: NOT AT ALL
2. FEELING DOWN, DEPRESSED OR HOPELESS: NOT AT ALL
SUM OF ALL RESPONSES TO PHQ9 QUESTIONS 1 & 2: 0
SUM OF ALL RESPONSES TO PHQ QUESTIONS 1-9: 0
1. LITTLE INTEREST OR PLEASURE IN DOING THINGS: NOT AT ALL
2. FEELING DOWN, DEPRESSED OR HOPELESS: NOT AT ALL
SUM OF ALL RESPONSES TO PHQ9 QUESTIONS 1 & 2: 0
SUM OF ALL RESPONSES TO PHQ QUESTIONS 1-9: 0
SUM OF ALL RESPONSES TO PHQ QUESTIONS 1-9: 0

## 2024-08-26 NOTE — TELEPHONE ENCOUNTER
I sent in hydrocortisone ointment.  She may have used this in the past but I think it has been several years since she had it.  If we have samples of Opzelura, she can try this but if we do not, she will have to try the hydrocortisone first.

## 2024-08-29 ENCOUNTER — OFFICE VISIT (OUTPATIENT)
Dept: PRIMARY CARE CLINIC | Age: 64
End: 2024-08-29
Payer: COMMERCIAL

## 2024-08-29 VITALS
DIASTOLIC BLOOD PRESSURE: 81 MMHG | SYSTOLIC BLOOD PRESSURE: 130 MMHG | HEIGHT: 66 IN | OXYGEN SATURATION: 100 % | TEMPERATURE: 97.8 F | BODY MASS INDEX: 31.82 KG/M2 | WEIGHT: 198 LBS | HEART RATE: 64 BPM

## 2024-08-29 DIAGNOSIS — I10 ESSENTIAL HYPERTENSION: ICD-10-CM

## 2024-08-29 DIAGNOSIS — Z00.00 ROUTINE PHYSICAL EXAMINATION: Primary | ICD-10-CM

## 2024-08-29 DIAGNOSIS — E03.9 ACQUIRED HYPOTHYROIDISM: ICD-10-CM

## 2024-08-29 DIAGNOSIS — Z00.00 ROUTINE PHYSICAL EXAMINATION: ICD-10-CM

## 2024-08-29 DIAGNOSIS — R74.8 ELEVATED LIVER ENZYMES: ICD-10-CM

## 2024-08-29 LAB
ALBUMIN SERPL-MCNC: 4.6 G/DL (ref 3.4–5)
ALBUMIN/GLOB SERPL: 1.8 {RATIO} (ref 1.1–2.2)
ALP SERPL-CCNC: 55 U/L (ref 40–129)
ALT SERPL-CCNC: 48 U/L (ref 10–40)
ANION GAP SERPL CALCULATED.3IONS-SCNC: 12 MMOL/L (ref 3–16)
AST SERPL-CCNC: 28 U/L (ref 15–37)
BASOPHILS # BLD: 0 K/UL (ref 0–0.2)
BASOPHILS NFR BLD: 0.5 %
BILIRUB SERPL-MCNC: 0.6 MG/DL (ref 0–1)
BUN SERPL-MCNC: 15 MG/DL (ref 7–20)
CALCIUM SERPL-MCNC: 10 MG/DL (ref 8.3–10.6)
CHLORIDE SERPL-SCNC: 103 MMOL/L (ref 99–110)
CHOLEST SERPL-MCNC: 212 MG/DL (ref 0–199)
CO2 SERPL-SCNC: 25 MMOL/L (ref 21–32)
CREAT SERPL-MCNC: 0.8 MG/DL (ref 0.6–1.2)
DEPRECATED RDW RBC AUTO: 12.2 % (ref 12.4–15.4)
EOSINOPHIL # BLD: 0.1 K/UL (ref 0–0.6)
EOSINOPHIL NFR BLD: 2.2 %
GFR SERPLBLD CREATININE-BSD FMLA CKD-EPI: 83 ML/MIN/{1.73_M2}
GLUCOSE SERPL-MCNC: 91 MG/DL (ref 70–99)
HCT VFR BLD AUTO: 46.1 % (ref 36–48)
HDLC SERPL-MCNC: 61 MG/DL (ref 40–60)
HGB BLD-MCNC: 15.3 G/DL (ref 12–16)
LDL CHOLESTEROL: 133 MG/DL
LYMPHOCYTES # BLD: 2 K/UL (ref 1–5.1)
LYMPHOCYTES NFR BLD: 37.8 %
MCH RBC QN AUTO: 31.3 PG (ref 26–34)
MCHC RBC AUTO-ENTMCNC: 33.3 G/DL (ref 31–36)
MCV RBC AUTO: 94.1 FL (ref 80–100)
MONOCYTES # BLD: 0.6 K/UL (ref 0–1.3)
MONOCYTES NFR BLD: 10.4 %
NEUTROPHILS # BLD: 2.6 K/UL (ref 1.7–7.7)
NEUTROPHILS NFR BLD: 49.1 %
PLATELET # BLD AUTO: 257 K/UL (ref 135–450)
PMV BLD AUTO: 9.7 FL (ref 5–10.5)
POTASSIUM SERPL-SCNC: 4.3 MMOL/L (ref 3.5–5.1)
PROT SERPL-MCNC: 7.2 G/DL (ref 6.4–8.2)
RBC # BLD AUTO: 4.9 M/UL (ref 4–5.2)
SODIUM SERPL-SCNC: 140 MMOL/L (ref 136–145)
TRIGL SERPL-MCNC: 88 MG/DL (ref 0–150)
TSH SERPL DL<=0.005 MIU/L-ACNC: 2.14 UIU/ML (ref 0.27–4.2)
VLDLC SERPL CALC-MCNC: 18 MG/DL
WBC # BLD AUTO: 5.4 K/UL (ref 4–11)

## 2024-08-29 PROCEDURE — 3075F SYST BP GE 130 - 139MM HG: CPT | Performed by: FAMILY MEDICINE

## 2024-08-29 PROCEDURE — 99396 PREV VISIT EST AGE 40-64: CPT | Performed by: FAMILY MEDICINE

## 2024-08-29 PROCEDURE — 3079F DIAST BP 80-89 MM HG: CPT | Performed by: FAMILY MEDICINE

## 2024-08-29 RX ORDER — LEVOTHYROXINE SODIUM 75 MCG
TABLET ORAL
Qty: 90 TABLET | Refills: 3 | Status: SHIPPED | OUTPATIENT
Start: 2024-08-29

## 2024-08-29 RX ORDER — LOSARTAN POTASSIUM 100 MG/1
TABLET ORAL
Qty: 90 TABLET | Refills: 3 | Status: SHIPPED | OUTPATIENT
Start: 2024-08-29

## 2024-08-29 RX ORDER — AMLODIPINE BESYLATE 5 MG/1
TABLET ORAL
Qty: 90 TABLET | Refills: 3 | Status: SHIPPED | OUTPATIENT
Start: 2024-08-29

## 2024-08-29 SDOH — ECONOMIC STABILITY: FOOD INSECURITY: WITHIN THE PAST 12 MONTHS, THE FOOD YOU BOUGHT JUST DIDN'T LAST AND YOU DIDN'T HAVE MONEY TO GET MORE.: NEVER TRUE

## 2024-08-29 SDOH — ECONOMIC STABILITY: INCOME INSECURITY: HOW HARD IS IT FOR YOU TO PAY FOR THE VERY BASICS LIKE FOOD, HOUSING, MEDICAL CARE, AND HEATING?: NOT HARD AT ALL

## 2024-08-29 SDOH — ECONOMIC STABILITY: FOOD INSECURITY: WITHIN THE PAST 12 MONTHS, YOU WORRIED THAT YOUR FOOD WOULD RUN OUT BEFORE YOU GOT MONEY TO BUY MORE.: NEVER TRUE

## 2024-08-29 NOTE — PROGRESS NOTES
[facility]  [facility] PRIMARY CARE  [address] [place] [address]  Dept: [phone]  Dept Fax: [phone]     8/29/2024      Nikki Portillo   1960     Chief Complaint   Patient presents with    Annual Exam       HPI    Pt comes in today for annual physical.    No acute concerns.     Date of last Mammogram: 5/7/2024     Date of last Colonoscopy: 5/12/2022   No cologuard on file  No FIT/FOBT on file   No flexible sigmoidoscopy on file     Wt Readings from Last 5 Encounters:   08/29/24 89.8 kg (198 lb)   12/14/23 88.8 kg (195 lb 12.8 oz)   08/30/23 86.4 kg (190 lb 6.4 oz)   07/31/23 87.9 kg (193 lb 12.8 oz)   04/04/23 88.1 kg (194 lb 3.2 oz)      BP Readings from Last 5 Encounters:   08/29/24 130/81   12/14/23 123/78   08/30/23 124/80   08/14/23 (!) 145/85   07/31/23 132/70         No data recorded       Prior to Visit Medications    Medication Sig Taking? Authorizing Provider   losartan (COZAAR) 100 MG tablet TAKE 1 TABLET DAILY Yes Daksha Munoz DO   SYNTHROID 75 MCG tablet TAKE 1 TABLET DAILY Yes Daksha Munoz DO   amLODIPine (NORVASC) 5 MG tablet TAKE 1 TABLET DAILY Yes Daksha Munoz DO   hydrocortisone 2.5 % ointment Apply to affected areas twice daily until improved and prn flares.  Avoid use for more than 2-3 weeks at a time.  Danielle Swanson MD        Past Medical History:   Diagnosis Date    Hypertension     Hypothyroidism         Social History     Tobacco Use    Smoking status: Never    Smokeless tobacco: Never   Vaping Use    Vaping status: Never Used   Substance Use Topics    Alcohol use: Yes     Alcohol/week: 4.0 standard drinks of alcohol     Types: 4 Glasses of wine per week    Drug use: No        Past Surgical History:   Procedure Laterality Date    APPENDECTOMY  01/01/2010    EYE LID SURGERY Left 08/2023    Left ptosis surgery - correction    EYE SURGERY Bilateral 04/2023    Ptosis correction    YARIEL AND BSO

## 2024-08-30 LAB
EST. AVERAGE GLUCOSE BLD GHB EST-MCNC: 108.3 MG/DL
HBA1C MFR BLD: 5.4 %

## 2024-08-30 ASSESSMENT — ENCOUNTER SYMPTOMS
SHORTNESS OF BREATH: 0
VOMITING: 0
ABDOMINAL PAIN: 0
DIARRHEA: 0
NAUSEA: 0

## 2024-08-30 NOTE — RESULT ENCOUNTER NOTE
Nikki,     Your labs are all stable. Still with just the mild elevation in one of your liver enzymes. Let me know if you wanted to go ahead with the liver ultrasound. Without any major change, I am comfortable just monitoring though.     Dr. Munoz

## 2024-11-26 ENCOUNTER — PATIENT MESSAGE (OUTPATIENT)
Dept: DERMATOLOGY | Age: 64
End: 2024-11-26

## 2024-12-02 ENCOUNTER — OFFICE VISIT (OUTPATIENT)
Dept: PRIMARY CARE CLINIC | Age: 64
End: 2024-12-02

## 2024-12-02 VITALS — SYSTOLIC BLOOD PRESSURE: 161 MMHG | DIASTOLIC BLOOD PRESSURE: 76 MMHG | HEART RATE: 75 BPM | TEMPERATURE: 98 F

## 2024-12-02 DIAGNOSIS — L08.9 INFECTED EPIDERMOID CYST: Primary | ICD-10-CM

## 2024-12-02 DIAGNOSIS — I10 ESSENTIAL HYPERTENSION: ICD-10-CM

## 2024-12-02 DIAGNOSIS — L72.0 INFECTED EPIDERMOID CYST: Primary | ICD-10-CM

## 2024-12-02 RX ORDER — CEPHALEXIN 500 MG/1
500 CAPSULE ORAL 4 TIMES DAILY
Qty: 28 CAPSULE | Refills: 0 | Status: SHIPPED | OUTPATIENT
Start: 2024-12-02 | End: 2024-12-09

## 2024-12-02 NOTE — PROGRESS NOTES
[facility]  [facility] PRIMARY CARE  [address] [place] [address]  Dept: [phone]  Dept Fax: [phone]     12/2/2024      Nikki Portillo   1960     Chief Complaint   Patient presents with    Mass     Patient c/o boil on upper back. Issue started last Tuesday. Patient states there is pain and tenderness to area.       HPI  Pt, routinely seen by Dr Daksha Munoz, comes in today for:    SKIN: Chronic cyst on back, but starting last week with more pain/swelling. Getting larger since then. There has been some DC that started Saturday. Covering, no specific warm compresses.         8/26/2024     7:54 AM 4/4/2023     2:09 PM 7/20/2022     9:34 AM 6/18/2021     9:36 AM 6/12/2019     9:37 AM 10/10/2018     2:39 PM   PHQ Scores   PHQ2 Score 0 0 0 0 0 0   PHQ9 Score 0 0 0 0 0 0     Interpretation of Total Score Depression Severity: 1-4 = Minimal depression, 5-9 = Mild depression, 10-14 = Moderate depression, 15-19 = Moderately severe depression, 20-27 = Severe depression     Prior to Visit Medications    Medication Sig Taking? Authorizing Provider   losartan (COZAAR) 100 MG tablet TAKE 1 TABLET DAILY Yes Daksha Munoz DO   SYNTHROID 75 MCG tablet TAKE 1 TABLET DAILY Yes Daksha Munoz DO   amLODIPine (NORVASC) 5 MG tablet TAKE 1 TABLET DAILY Yes Daksha Munoz DO       Past Medical History:   Diagnosis Date    Hypertension     Hypothyroidism         Social History     Tobacco Use    Smoking status: Never    Smokeless tobacco: Never   Vaping Use    Vaping status: Never Used   Substance Use Topics    Alcohol use: Yes     Alcohol/week: 4.0 standard drinks of alcohol     Types: 4 Glasses of wine per week    Drug use: No        Past Surgical History:   Procedure Laterality Date    APPENDECTOMY  01/01/2010    EYE LID SURGERY Left 08/2023    Left ptosis surgery - correction    EYE SURGERY Bilateral 04/2023    Ptosis correction    YARIEL AND BSO

## 2024-12-03 NOTE — TELEPHONE ENCOUNTER
Patient went to PCP yesterday (cyst on back), prescribed antibiotics.     Patient feels the area is healing just since yesterday (actually looked better than the picture).    Schedule to have area removed in February 2025.

## 2024-12-05 LAB
BACTERIA SPEC AEROBE CULT: ABNORMAL
GRAM STN SPEC: ABNORMAL
ORGANISM: ABNORMAL

## 2024-12-05 NOTE — RESULT ENCOUNTER NOTE
Amanda Jordan, how is the wound looking? Antibiotic for this bacteria not specifically checked - this usually means it covers well

## 2025-02-06 ENCOUNTER — PROCEDURE VISIT (OUTPATIENT)
Age: 65
End: 2025-02-06
Payer: COMMERCIAL

## 2025-02-06 DIAGNOSIS — L98.9 SKIN EROSION: ICD-10-CM

## 2025-02-06 DIAGNOSIS — L72.0 EPIDERMOID CYST: Primary | ICD-10-CM

## 2025-02-06 PROCEDURE — 99213 OFFICE O/P EST LOW 20 MIN: CPT | Performed by: DERMATOLOGY

## 2025-02-06 RX ORDER — MUPIROCIN 20 MG/G
OINTMENT TOPICAL
Qty: 22 G | Refills: 1 | Status: SHIPPED | OUTPATIENT
Start: 2025-02-06

## 2025-02-06 RX ORDER — CEPHALEXIN 500 MG/1
500 CAPSULE ORAL 4 TIMES DAILY
Qty: 28 CAPSULE | Refills: 0 | Status: SHIPPED | OUTPATIENT
Start: 2025-02-06 | End: 2025-02-13

## 2025-02-06 NOTE — PROGRESS NOTES
[facility]  Danielle Swanson MD  [phone]      Nikki Portillo  1960    64 y.o. female     Date of Visit: 2/6/2025    Chief Complaint:   Chief Complaint   Patient presents with    Cyst       History of Present Illness  The patient is a 64-year-old female who presents for the excision of an epidermoid cyst located on her back.    Epidermoid Cyst - upper back  - The cyst became inflamed in November 2024 and was treated with antibiotics.  - Since then, it has remained stable, with no current pain or drainage.  - A culture identified Staphylococcus lugdunensis   - The patient wishes to postpone the excision due to no papule present and asymptomatic and has an upcoming trip to the [facility] in April 2025 and has requested antibiotics to take along as a precautionary measure in case this gets inflamed while she is gone.  - She has been prescribed Keflex.    Erythematous Lesion  - The patient reports an erythematous acneiform eroded lesion on her right cheek.  - She is seeking a topical treatment to facilitate healing.    ALLERGIES  Allergic to BACTRIM.    MEDICATIONS  Keflex        Review of Systems:  Gen: Feels well, good sense of health.  No F/C.  Skin: No changing moles or lesions.  No new rashes.    Past Medical History, Family History, Surgical History, Medications and Allergies reviewed.    Outpatient Medications Marked as Taking for the 2/6/25 encounter (Procedure visit) with Danielle Swanson MD   Medication Sig Dispense Refill    losartan (COZAAR) 100 MG tablet TAKE 1 TABLET DAILY 90 tablet 3    SYNTHROID 75 MCG tablet TAKE 1 TABLET DAILY 90 tablet 3    amLODIPine (NORVASC) 5 MG tablet TAKE 1 TABLET DAILY 90 tablet 3     Allergies   Allergen Reactions    Bactrim [Sulfamethoxazole-Trimethoprim] Hives and Rash    Lisinopril Cough       Past Medical History:   Diagnosis Date    Hypertension     Hypothyroidism      Past Surgical History:   Procedure Laterality Date    APPENDECTOMY

## 2025-02-25 ENCOUNTER — PATIENT MESSAGE (OUTPATIENT)
Dept: PRIMARY CARE CLINIC | Age: 65
End: 2025-02-25

## 2025-02-28 SDOH — ECONOMIC STABILITY: FOOD INSECURITY: WITHIN THE PAST 12 MONTHS, YOU WORRIED THAT YOUR FOOD WOULD RUN OUT BEFORE YOU GOT MONEY TO BUY MORE.: NEVER TRUE

## 2025-02-28 SDOH — ECONOMIC STABILITY: TRANSPORTATION INSECURITY
IN THE PAST 12 MONTHS, HAS THE LACK OF TRANSPORTATION KEPT YOU FROM MEDICAL APPOINTMENTS OR FROM GETTING MEDICATIONS?: NO

## 2025-02-28 SDOH — ECONOMIC STABILITY: TRANSPORTATION INSECURITY
IN THE PAST 12 MONTHS, HAS LACK OF TRANSPORTATION KEPT YOU FROM MEETINGS, WORK, OR FROM GETTING THINGS NEEDED FOR DAILY LIVING?: NO

## 2025-02-28 SDOH — ECONOMIC STABILITY: FOOD INSECURITY: WITHIN THE PAST 12 MONTHS, THE FOOD YOU BOUGHT JUST DIDN'T LAST AND YOU DIDN'T HAVE MONEY TO GET MORE.: NEVER TRUE

## 2025-02-28 SDOH — ECONOMIC STABILITY: INCOME INSECURITY: IN THE LAST 12 MONTHS, WAS THERE A TIME WHEN YOU WERE NOT ABLE TO PAY THE MORTGAGE OR RENT ON TIME?: NO

## 2025-02-28 ASSESSMENT — PATIENT HEALTH QUESTIONNAIRE - PHQ9
SUM OF ALL RESPONSES TO PHQ QUESTIONS 1-9: 0
SUM OF ALL RESPONSES TO PHQ QUESTIONS 1-9: 0
SUM OF ALL RESPONSES TO PHQ9 QUESTIONS 1 & 2: 0
2. FEELING DOWN, DEPRESSED OR HOPELESS: NOT AT ALL
2. FEELING DOWN, DEPRESSED OR HOPELESS: NOT AT ALL
1. LITTLE INTEREST OR PLEASURE IN DOING THINGS: NOT AT ALL
SUM OF ALL RESPONSES TO PHQ QUESTIONS 1-9: 0
1. LITTLE INTEREST OR PLEASURE IN DOING THINGS: NOT AT ALL
SUM OF ALL RESPONSES TO PHQ QUESTIONS 1-9: 0

## 2025-03-03 ENCOUNTER — OFFICE VISIT (OUTPATIENT)
Dept: PRIMARY CARE CLINIC | Age: 65
End: 2025-03-03

## 2025-03-03 VITALS
SYSTOLIC BLOOD PRESSURE: 124 MMHG | HEART RATE: 80 BPM | WEIGHT: 199.8 LBS | OXYGEN SATURATION: 99 % | DIASTOLIC BLOOD PRESSURE: 68 MMHG | BODY MASS INDEX: 32.25 KG/M2

## 2025-03-03 DIAGNOSIS — M25.511 ACUTE PAIN OF RIGHT SHOULDER: ICD-10-CM

## 2025-03-03 DIAGNOSIS — Z71.84 TRAVEL ADVICE ENCOUNTER: Primary | ICD-10-CM

## 2025-03-03 RX ORDER — AZITHROMYCIN 500 MG/1
500 TABLET, FILM COATED ORAL DAILY
Qty: 3 TABLET | Refills: 0 | Status: SHIPPED | OUTPATIENT
Start: 2025-03-03 | End: 2025-03-06

## 2025-03-03 NOTE — PROGRESS NOTES
Problem Relation Age of Onset    Hypertension Mother     Thyroid Cancer Mother 75        anaplastic    Heart Disease Father         valve        Patient's past medical history, surgical history, family history, medications, and allergies  were all reviewed and updated as appropriate today.    Review of Systems   Constitutional:  Negative for fever.   Musculoskeletal:  Negative for joint swelling.       /68   Pulse 80   Wt 90.6 kg (199 lb 12.8 oz)   SpO2 99%   BMI 32.25 kg/m²      Physical Exam  Constitutional:       General: She is not in acute distress.     Appearance: Normal appearance. She is not ill-appearing.   Pulmonary:      Effort: Pulmonary effort is normal.   Musculoskeletal:      Right shoulder: No swelling or deformity. Normal range of motion.   Neurological:      General: No focal deficit present.      Mental Status: She is alert.   Psychiatric:         Mood and Affect: Mood normal.         Assessment:  Encounter Diagnoses   Name Primary?    Travel advice encounter Yes    Acute pain of right shoulder        Plan:    - Recommended pepto bismol daily for prevention of traveler's diarrhea. Azithromycin to travel with for treatment if needed. Declines motion sickness treatment. UTD on immunizations.   - Right shoulder - recommend break from heavy lifting. Ice/NSAIDs. Consider PT if no improvement.     New Prescriptions    AZITHROMYCIN (ZITHROMAX) 500 MG TABLET    Take 1 tablet by mouth daily for 3 days        No orders of the defined types were placed in this encounter.       Return if symptoms worsen or fail to improve.         Daksha Munoz, DO

## 2025-06-23 ENCOUNTER — OFFICE VISIT (OUTPATIENT)
Age: 65
End: 2025-06-23
Payer: COMMERCIAL

## 2025-06-23 DIAGNOSIS — D22.9 MULTIPLE NEVI: Primary | ICD-10-CM

## 2025-06-23 DIAGNOSIS — L70.0 ACNE VULGARIS: ICD-10-CM

## 2025-06-23 DIAGNOSIS — L21.9 SEBORRHEIC DERMATITIS: ICD-10-CM

## 2025-06-23 DIAGNOSIS — Z80.8 FAMILY HISTORY OF MALIGNANT MELANOMA: ICD-10-CM

## 2025-06-23 DIAGNOSIS — L57.0 AK (ACTINIC KERATOSIS): ICD-10-CM

## 2025-06-23 DIAGNOSIS — L72.0 EPIDERMOID CYST: ICD-10-CM

## 2025-06-23 DIAGNOSIS — D23.9 DERMATOFIBROMA: ICD-10-CM

## 2025-06-23 PROCEDURE — 99214 OFFICE O/P EST MOD 30 MIN: CPT | Performed by: DERMATOLOGY

## 2025-06-23 NOTE — PROGRESS NOTES
[facility]  Danielle Swanson MD  [phone]      Nikki Portillo  1960    64 y.o. female     Date of Visit: 6/23/2025    Chief Complaint: moles/lesions  Chief Complaint   Patient presents with    Skin Exam     Last seen: 1 year ago for FSE and more recently for cyst  *Oldest daughter in [place] had a baby boy summer 2022    History of Present Illness:    History of Present Illness  The patient is a 64-year-old female presenting with multiple concerns.      1. Here for full skin check; has scattered asx moles on the trunk and extremities, many present since child/young adult; no change in s/s/c of any lesions; no bleeding lesions.  No personal hx of skin cancer.    Daughter with hx of atypical pigmented lesion/MM in situ as noted below.  Wears sunscreen regularly.    2. F/u for several year hx of erythema and scaling involving the brows.  It occurs intermittently and is worse in the winter.  She has repeatedly noted that she clears completely intermittently.  She has tried HC 2.5 oint and elidel in the past w/o significant improvement.  Previously the central part of her brows were flared.  She picks at the area.  Denies rough spots/scaling elsewhere on the face or body. Lateral part of brows flared minimally and she feels like they are much better and not bothersome.  Central area with minimal activity today.    3. F/u mild comedonal acne and milia.  She had used atralin for awhile after a previous visit but has not been using anything recently.  Has been getting dermaplaning, peels or hydrafacials or [place] and feels that this helps.  Had IPL for eyes but face also treated in 2024.    4. F/u AK's-  No new concerns.    5. S/p bx of the L cheek  - read as ISK.  Treated with LN2 for residual lesion and this remains clear.    6.  She has a bump at the back that she would like checked.  Asymptomatic.    7.  Previously noted focal discolored area near the base of the R thumbnail.  Denies

## 2025-09-04 ENCOUNTER — OFFICE VISIT (OUTPATIENT)
Dept: PRIMARY CARE CLINIC | Age: 65
End: 2025-09-04
Payer: COMMERCIAL

## 2025-09-04 VITALS
WEIGHT: 195.2 LBS | SYSTOLIC BLOOD PRESSURE: 132 MMHG | OXYGEN SATURATION: 98 % | DIASTOLIC BLOOD PRESSURE: 68 MMHG | HEART RATE: 72 BPM | BODY MASS INDEX: 31.51 KG/M2

## 2025-09-04 DIAGNOSIS — E03.9 ACQUIRED HYPOTHYROIDISM: ICD-10-CM

## 2025-09-04 DIAGNOSIS — M85.852 OSTEOPENIA OF BOTH HIPS: ICD-10-CM

## 2025-09-04 DIAGNOSIS — Z78.0 POST-MENOPAUSE: ICD-10-CM

## 2025-09-04 DIAGNOSIS — M85.851 OSTEOPENIA OF BOTH HIPS: ICD-10-CM

## 2025-09-04 DIAGNOSIS — I10 ESSENTIAL HYPERTENSION: ICD-10-CM

## 2025-09-04 DIAGNOSIS — Z00.00 ROUTINE PHYSICAL EXAMINATION: Primary | ICD-10-CM

## 2025-09-04 PROCEDURE — 3078F DIAST BP <80 MM HG: CPT | Performed by: FAMILY MEDICINE

## 2025-09-04 PROCEDURE — 3075F SYST BP GE 130 - 139MM HG: CPT | Performed by: FAMILY MEDICINE

## 2025-09-04 PROCEDURE — 99396 PREV VISIT EST AGE 40-64: CPT | Performed by: FAMILY MEDICINE

## 2025-09-04 RX ORDER — LOSARTAN POTASSIUM 100 MG/1
TABLET ORAL
Qty: 90 TABLET | Refills: 3 | Status: SHIPPED | OUTPATIENT
Start: 2025-09-04

## 2025-09-04 RX ORDER — AMLODIPINE BESYLATE 5 MG/1
TABLET ORAL
Qty: 90 TABLET | Refills: 3 | Status: SHIPPED | OUTPATIENT
Start: 2025-09-04

## 2025-09-04 RX ORDER — LEVOTHYROXINE SODIUM 75 MCG
TABLET ORAL
Qty: 90 TABLET | Refills: 3 | Status: SHIPPED | OUTPATIENT
Start: 2025-09-04

## 2025-09-05 ASSESSMENT — ENCOUNTER SYMPTOMS
DIARRHEA: 0
VOMITING: 0
SHORTNESS OF BREATH: 0
ABDOMINAL PAIN: 0
NAUSEA: 0